# Patient Record
Sex: FEMALE | Race: WHITE | NOT HISPANIC OR LATINO | Employment: OTHER | URBAN - METROPOLITAN AREA
[De-identification: names, ages, dates, MRNs, and addresses within clinical notes are randomized per-mention and may not be internally consistent; named-entity substitution may affect disease eponyms.]

---

## 2024-04-12 ENCOUNTER — TELEPHONE (OUTPATIENT)
Dept: PSYCHIATRY | Facility: CLINIC | Age: 39
End: 2024-04-12

## 2024-04-12 NOTE — TELEPHONE ENCOUNTER
Patient called in looking for med mgmt services. Writer sending a message to Mony to give patient a call.

## 2024-04-12 NOTE — TELEPHONE ENCOUNTER
"Behavioral Health Outpatient Intake Questions    Referred By   : SELF    Please advise interviewee that they need to answer all questions truthfully to allow for best care, and any misrepresentations of information may affect their ability to be seen at this clinic   => Was this discussed? Yes     If Minor Child (under age 18)    Who is/are the legal guardian(s) of the child?     Is there a custody agreement? No     If \"YES\"- Custody orders must be obtained prior to scheduling the first appointment  In addition, Consent to Treatment must be signed by all legal guardians prior to scheduling the first appointment    If \"NO\"- Consent to Treatment must be signed by all legal guardians prior to scheduling the first appointment    Behavioral Health Outpatient Intake History -     Presenting Problem (in patient's own words): Med mgt services.  Moved here in January and needs a new provider    Are there any communication barriers for this patient?     No                                               If yes, please describe barriers:   If there is a unique situation, please refer to Jian Novak/Elvira Christine for final determination.    Are you taking any psychiatric medications? Yes     If \"YES\" -What are they Ziprasidone    If \"YES\" -Who prescribes? Dr. Clemente Ennis    Has the Patient previously received outpatient Talk Therapy or Medication Management from [facility]  No        If \"YES\"- When, Where and with Whom?         If \"NO\" -Has Patient received these services elsewhere?       If \"YES\" -When, Where, and with Whom?    Has the Patient abused alcohol or other substances in the last 6 months ? No       If \"YES\" -What substance, How much, How often?     If illegal substance: Refer to [facility] (for ADELIA) or [facility].   If Alcohol in excess of 10 drinks per week:  Refer to [facility] (for ADELIA) or [facility]    Legal History-     Is this treatment court ordered? No   If \"yes \"send to :  Talk Therapy " ": Send to Jian Christine for final determination   Med Management: Send to Dr Carrion for final determination     Has the Patient been convicted of a felony?  No   If \"Yes\" send to -When, What?  Talk Therapy : Send to Jian Christine for final determination   Med Management: Send to Dr Carrion for final determination     ACCEPTED as a patient Yes  If \"Yes\" Appointment Date: 4/24/24    Referred Elsewhere? No  If “Yes” - (Where? Ex: [facility], [facility], [facility], [facility], etc.)     Name of Insurance Co: [place] [employer]/ [facility]   Insurance ID# [id number]  Insurance Phone #  If ins is primary or secondary?  If patient is a minor, parents information such as Name, D.O.B of guarantor.  "

## 2024-04-22 NOTE — BH TREATMENT PLAN
TREATMENT PLAN (Medication Management Only)        [facility] - [facility]    Name and Date of Birth:  Oksana Yuan 38 y.o. 1985  Date of Treatment Plan: April 24, 2024  Diagnosis/Diagnoses:    1. MARIBEL (generalized anxiety disorder)    2. Major depressive disorder, recurrent, severe with psychotic features (HCC)    3. Paranoid schizophrenia (HCC)      Strengths/Personal Resources for Self-Care: supportive friends, taking medications as prescribed.  Area/Areas of need (in own words): anxiety, depression, sleep problems  1. Long Term Goal: improve mood.  Target Date:6 months - 10/24/2024  Person/Persons responsible for completion of goal: Oksana  2.  Short Term Objective (s) - How will we reach this goal?:   A. Provider new recommended medication/dosage changes and/or continue medication(s): continue current medications as prescribed.  B. Attend medication management appointments regularly.  C. N/A.  Target Date:6 months - 10/24/2024  Person/Persons Responsible for Completion of Goal: Oksana  Progress Towards Goals: continuing treatment  Treatment Modality: medication management every 1 months  Review due 180 days from date of this plan: 6 months - 10/24/2024  Expected length of service: ongoing treatment  My Physician/PA/NP and I have developed this plan together and I agree to work on the goals and objectives. I understand the treatment goals that were developed for my treatment.

## 2024-04-22 NOTE — BH CRISIS PLAN
Client Name: Oksana Yuan       Client YOB: 1985    MikeJed Safety Plan      Creation Date: 4/24/24 Update Date: 4/24/24   Created By: Yani Plaza PA-C Last Updated By: Yani Plaza PA-C      Step 1: Warning Signs:   Warning Signs   Stop taking care of myself   Destructive to environment and self   Increased self harming   Substance use increased            Step 2: Internal Coping Strategies:   Internal Coping Strategies   Go for a walk and get fresh air   Play guitar or listen to music   Watch tv or a show   Writing or drawing            Step 3: People and social settings that provide distraction:   Name Contact Information   Roommate (Pascual) number in cell phone   Roommate (Clarita) number in cell phone   Roommate (Adri) number in cell phone            Step 4: People whom I can ask for help during a crisis:      Name Contact Information    Roommate (Pascual) number in cell phone    Roommate (Clarita) number in cell phone    Roommate (Adri) number in cell phone      Step 5: Professionals or agencies I can contact during a crisis:      Clinican/Agency Name Phone Emergency Contact    Yani Plaza PA-C [phone]       Local Emergency Department Emergency Department Phone Emergency Department Address    911          Crisis Phone Numbers:   Suicide Prevention Lifeline: Call or Text  597 Crisis Text Line: Text HOME to 855-609   Please note: Some [facility] do not have a separate number for Child/Adolescent specific crisis. If your county is not listed under Child/Adolescent, please call the adult number for your county      Adult Crisis Numbers: Child/Adolescent Crisis Numbers   [facility]: [phone] [facility]: [phone]   [facility]: [phone] [facility]: [phone]   [facility]: [phone] [place]: [phone]   [facility]: [phone] [facility]: [phone]   [facility]: [phone]    [facility]: [phone]   [facility]: [phone]   [facility]: [phone] (daytime) [phone] (after hours, weekends, holidays)      Step 6: Making the environment safer (plan for lethal means safety):   Patient did not identify any lethal methods: Yes     Optional: What is most important to me and worth living for?   The people that are in my life      [place] Safety Plan. Kailyn Mckeon and Curry Adkins. Used with permission of the authors.
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
## 2024-04-24 ENCOUNTER — OFFICE VISIT (OUTPATIENT)
Dept: PSYCHIATRY | Facility: CLINIC | Age: 39
End: 2024-04-24
Payer: COMMERCIAL

## 2024-04-24 DIAGNOSIS — F20.0 PARANOID SCHIZOPHRENIA (HCC): ICD-10-CM

## 2024-04-24 DIAGNOSIS — F41.1 GAD (GENERALIZED ANXIETY DISORDER): Primary | ICD-10-CM

## 2024-04-24 DIAGNOSIS — F33.3 MAJOR DEPRESSIVE DISORDER, RECURRENT, SEVERE WITH PSYCHOTIC FEATURES (HCC): ICD-10-CM

## 2024-04-24 DIAGNOSIS — G47.00 INSOMNIA, UNSPECIFIED TYPE: ICD-10-CM

## 2024-04-24 PROBLEM — R10.13 CHRONIC EPIGASTRIC PAIN: Status: ACTIVE | Noted: 2017-04-17

## 2024-04-24 PROBLEM — Z91.51 H/O: SUICIDE ATTEMPT: Status: ACTIVE | Noted: 2017-02-08

## 2024-04-24 PROBLEM — F32.A DEPRESSION: Status: ACTIVE | Noted: 2017-02-08

## 2024-04-24 PROBLEM — F64.9 GENDER DYSPHORIA: Status: ACTIVE | Noted: 2017-04-17

## 2024-04-24 PROBLEM — E34.9 ENDOCRINOPATHY: Status: ACTIVE | Noted: 2017-04-17

## 2024-04-24 PROBLEM — L60.0 INGROWN NAIL: Status: ACTIVE | Noted: 2021-08-30

## 2024-04-24 PROBLEM — F64.0 TRANSSEXUALISM: Status: ACTIVE | Noted: 2017-02-08

## 2024-04-24 PROBLEM — G89.29 CHRONIC EPIGASTRIC PAIN: Status: ACTIVE | Noted: 2017-04-17

## 2024-04-24 PROCEDURE — 90792 PSYCH DIAG EVAL W/MED SRVCS: CPT

## 2024-04-24 RX ORDER — ZIPRASIDONE HYDROCHLORIDE 20 MG/1
CAPSULE ORAL
COMMUNITY
Start: 2024-04-04 | End: 2024-04-24 | Stop reason: SDUPTHER

## 2024-04-24 RX ORDER — ZIPRASIDONE HYDROCHLORIDE 20 MG/1
20 CAPSULE ORAL
Qty: 30 CAPSULE | Refills: 2 | Status: SHIPPED | OUTPATIENT
Start: 2024-04-24 | End: 2024-05-08

## 2024-04-24 RX ORDER — ZIPRASIDONE HYDROCHLORIDE 40 MG/1
CAPSULE ORAL
COMMUNITY
Start: 2024-04-04 | End: 2024-04-24 | Stop reason: SDUPTHER

## 2024-04-24 RX ORDER — CLONAZEPAM 0.5 MG/1
0.5 TABLET ORAL 2 TIMES DAILY
Qty: 30 TABLET | Refills: 0 | Status: SHIPPED | OUTPATIENT
Start: 2024-04-24 | End: 2024-05-08

## 2024-04-24 RX ORDER — DULOXETIN HYDROCHLORIDE 20 MG/1
20 CAPSULE, DELAYED RELEASE ORAL DAILY
COMMUNITY
Start: 2024-03-25 | End: 2024-04-24 | Stop reason: SDUPTHER

## 2024-04-24 RX ORDER — ZOLPIDEM TARTRATE 5 MG/1
5 TABLET ORAL
Qty: 30 TABLET | Refills: 0 | Status: SHIPPED | OUTPATIENT
Start: 2024-04-24

## 2024-04-24 RX ORDER — ZIPRASIDONE HYDROCHLORIDE 40 MG/1
40 CAPSULE ORAL EVERY MORNING
Qty: 30 CAPSULE | Refills: 2 | Status: SHIPPED | OUTPATIENT
Start: 2024-04-24 | End: 2024-05-08

## 2024-04-24 RX ORDER — DULOXETIN HYDROCHLORIDE 20 MG/1
20 CAPSULE, DELAYED RELEASE ORAL DAILY
Qty: 30 CAPSULE | Refills: 2 | Status: SHIPPED | OUTPATIENT
Start: 2024-04-24
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
## 2024-05-08 ENCOUNTER — TELEMEDICINE (OUTPATIENT)
Dept: PSYCHIATRY | Facility: CLINIC | Age: 39
End: 2024-05-08
Payer: COMMERCIAL

## 2024-05-08 DIAGNOSIS — F20.0 PARANOID SCHIZOPHRENIA (HCC): Primary | ICD-10-CM

## 2024-05-08 DIAGNOSIS — F20.0 PARANOID SCHIZOPHRENIA (HCC): ICD-10-CM

## 2024-05-08 DIAGNOSIS — F41.1 GAD (GENERALIZED ANXIETY DISORDER): ICD-10-CM

## 2024-05-08 DIAGNOSIS — F33.3 MAJOR DEPRESSIVE DISORDER, RECURRENT, SEVERE WITH PSYCHOTIC FEATURES (HCC): ICD-10-CM

## 2024-05-08 DIAGNOSIS — F64.9 GENDER DYSPHORIA: ICD-10-CM

## 2024-05-08 PROCEDURE — 99214 OFFICE O/P EST MOD 30 MIN: CPT

## 2024-05-08 RX ORDER — LURASIDONE HYDROCHLORIDE 40 MG/1
40 TABLET, FILM COATED ORAL
Qty: 30 TABLET | Refills: 2 | Status: SHIPPED | OUTPATIENT
Start: 2024-05-08 | End: 2024-05-09

## 2024-05-08 RX ORDER — CLONAZEPAM 1 MG/1
1 TABLET ORAL 2 TIMES DAILY
Qty: 60 TABLET | Refills: 0 | Status: SHIPPED | OUTPATIENT
Start: 2024-05-08

## 2024-05-09 RX ORDER — LURASIDONE HYDROCHLORIDE 40 MG/1
40 TABLET, FILM COATED ORAL
Qty: 30 TABLET | Refills: 2 | Status: SHIPPED | OUTPATIENT
Start: 2024-05-09

## 2024-05-09 NOTE — TELEPHONE ENCOUNTER
Rec'd approval from insurance   Message left for pharmacy   Approval scanned     Can you please send this back as it's been approved

## 2024-05-10 ENCOUNTER — DOCUMENTATION (OUTPATIENT)
Dept: PSYCHIATRY | Facility: CLINIC | Age: 39
End: 2024-05-10

## 2024-05-21 DIAGNOSIS — G47.00 INSOMNIA, UNSPECIFIED TYPE: ICD-10-CM

## 2024-05-21 PROCEDURE — NC001 PR NO CHARGE

## 2024-05-21 RX ORDER — ZOLPIDEM TARTRATE 5 MG/1
5 TABLET ORAL
Qty: 30 TABLET | Refills: 0 | Status: SHIPPED | OUTPATIENT
Start: 2024-05-21
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
## 2024-06-05 ENCOUNTER — TELEMEDICINE (OUTPATIENT)
Dept: PSYCHIATRY | Facility: CLINIC | Age: 39
End: 2024-06-05
Payer: COMMERCIAL

## 2024-06-05 DIAGNOSIS — F41.1 GAD (GENERALIZED ANXIETY DISORDER): ICD-10-CM

## 2024-06-05 DIAGNOSIS — F33.3 MAJOR DEPRESSIVE DISORDER, RECURRENT, SEVERE WITH PSYCHOTIC FEATURES (HCC): ICD-10-CM

## 2024-06-05 DIAGNOSIS — F20.0 PARANOID SCHIZOPHRENIA (HCC): Primary | ICD-10-CM

## 2024-06-05 DIAGNOSIS — F33.1 MODERATE EPISODE OF RECURRENT MAJOR DEPRESSIVE DISORDER (HCC): ICD-10-CM

## 2024-06-05 DIAGNOSIS — G47.00 INSOMNIA, UNSPECIFIED TYPE: ICD-10-CM

## 2024-06-05 DIAGNOSIS — F64.9 GENDER DYSPHORIA: ICD-10-CM

## 2024-06-05 PROCEDURE — 99213 OFFICE O/P EST LOW 20 MIN: CPT

## 2024-06-05 RX ORDER — ESTRADIOL VALERATE 40 MG/ML
INJECTION INTRAMUSCULAR
COMMUNITY
Start: 2024-06-04

## 2024-06-05 RX ORDER — LURASIDONE HYDROCHLORIDE 80 MG/1
80 TABLET, FILM COATED ORAL
Qty: 30 TABLET | Refills: 2 | Status: SHIPPED | OUTPATIENT
Start: 2024-06-05

## 2024-06-05 RX ORDER — CLONAZEPAM 1 MG/1
1 TABLET ORAL 2 TIMES DAILY
Qty: 60 TABLET | Refills: 0 | Status: SHIPPED | OUTPATIENT
Start: 2024-06-05

## 2024-06-05 RX ORDER — ZOLPIDEM TARTRATE 5 MG/1
5 TABLET ORAL
Qty: 30 TABLET | Refills: 0 | Status: SHIPPED | OUTPATIENT
Start: 2024-06-19

## 2024-06-05 RX ORDER — PROGESTERONE 200 MG/1
CAPSULE ORAL
COMMUNITY
Start: 2024-06-03

## 2024-06-10 ENCOUNTER — TELEPHONE (OUTPATIENT)
Dept: PSYCHIATRY | Facility: CLINIC | Age: 39
End: 2024-06-10

## 2024-06-10 NOTE — TELEPHONE ENCOUNTER
Unable to leave voicemail informing patient of the Psych Encounter form needing to be signed as a requirement from the insurance company for billing purposes. If patients contacts office, please make patient aware that the form can be accessed via [employer] to sign electronically and must be signed for each visit as a requirement to continue future visits with provider.

## 2024-06-18 ENCOUNTER — TELEPHONE (OUTPATIENT)
Dept: PSYCHIATRY | Facility: CLINIC | Age: 39
End: 2024-06-18

## 2024-06-18 NOTE — TELEPHONE ENCOUNTER
Unable to leave voicemail informing patient of the Psych Encounter form needing to be signed as a requirement from the insurance company for billing purposes. If patients contacts office, please make patient aware that the form can be accessed via [employer] to sign electronically and must be signed for each visit as a requirement to continue future visits with provider.

## 2024-06-24 ENCOUNTER — TELEPHONE (OUTPATIENT)
Dept: PSYCHIATRY | Facility: CLINIC | Age: 39
End: 2024-06-24

## 2024-06-24 NOTE — TELEPHONE ENCOUNTER
Unable to leave voicemail informing patient of the Psych Encounter form needing to be signed as a requirement from the insurance company for billing purposes. If patients contacts office, please make patient aware that the form can be accessed via [employer] to sign electronically and must be signed for each visit as a requirement to continue future visits with provider.
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
## 2024-07-03 ENCOUNTER — TELEMEDICINE (OUTPATIENT)
Dept: PSYCHIATRY | Facility: CLINIC | Age: 39
End: 2024-07-03
Payer: COMMERCIAL

## 2024-07-03 DIAGNOSIS — F64.9 GENDER DYSPHORIA: ICD-10-CM

## 2024-07-03 DIAGNOSIS — F20.0 PARANOID SCHIZOPHRENIA (HCC): Primary | ICD-10-CM

## 2024-07-03 DIAGNOSIS — F41.1 GAD (GENERALIZED ANXIETY DISORDER): ICD-10-CM

## 2024-07-03 DIAGNOSIS — G47.00 INSOMNIA, UNSPECIFIED TYPE: ICD-10-CM

## 2024-07-03 DIAGNOSIS — F33.3 MAJOR DEPRESSIVE DISORDER, RECURRENT, SEVERE WITH PSYCHOTIC FEATURES (HCC): ICD-10-CM

## 2024-07-03 PROCEDURE — 99213 OFFICE O/P EST LOW 20 MIN: CPT

## 2024-07-03 RX ORDER — DULOXETIN HYDROCHLORIDE 30 MG/1
30 CAPSULE, DELAYED RELEASE ORAL DAILY
Qty: 30 CAPSULE | Refills: 2 | Status: SHIPPED | OUTPATIENT
Start: 2024-07-03

## 2024-07-03 RX ORDER — LORAZEPAM 1 MG/1
1 TABLET ORAL 2 TIMES DAILY PRN
Qty: 60 TABLET | Refills: 0 | Status: SHIPPED | OUTPATIENT
Start: 2024-07-03

## 2024-07-03 RX ORDER — ZOLPIDEM TARTRATE 5 MG/1
5 TABLET ORAL
Qty: 30 TABLET | Refills: 0 | Status: SHIPPED | OUTPATIENT
Start: 2024-07-03
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
## 2024-07-31 ENCOUNTER — TELEMEDICINE (OUTPATIENT)
Dept: PSYCHIATRY | Facility: CLINIC | Age: 39
End: 2024-07-31
Payer: COMMERCIAL

## 2024-07-31 DIAGNOSIS — F33.3 MAJOR DEPRESSIVE DISORDER, RECURRENT, SEVERE WITH PSYCHOTIC FEATURES (HCC): Primary | ICD-10-CM

## 2024-07-31 DIAGNOSIS — F20.0 PARANOID SCHIZOPHRENIA (HCC): ICD-10-CM

## 2024-07-31 DIAGNOSIS — G47.00 INSOMNIA, UNSPECIFIED TYPE: ICD-10-CM

## 2024-07-31 DIAGNOSIS — F64.9 GENDER DYSPHORIA: ICD-10-CM

## 2024-07-31 DIAGNOSIS — F41.1 GAD (GENERALIZED ANXIETY DISORDER): ICD-10-CM

## 2024-07-31 PROCEDURE — 99212 OFFICE O/P EST SF 10 MIN: CPT

## 2024-07-31 RX ORDER — ZOLPIDEM TARTRATE 5 MG/1
5 TABLET ORAL
Qty: 30 TABLET | Refills: 0 | Status: SHIPPED | OUTPATIENT
Start: 2024-07-31

## 2024-07-31 RX ORDER — LORAZEPAM 2 MG/1
2 TABLET ORAL 2 TIMES DAILY PRN
Qty: 28 TABLET | Refills: 0 | Status: SHIPPED | OUTPATIENT
Start: 2024-07-31

## 2024-07-31 RX ORDER — DULOXETIN HYDROCHLORIDE 60 MG/1
60 CAPSULE, DELAYED RELEASE ORAL DAILY
Qty: 30 CAPSULE | Refills: 1 | Status: SHIPPED | OUTPATIENT
Start: 2024-07-31

## 2024-08-12 ENCOUNTER — TELEPHONE (OUTPATIENT)
Dept: PSYCHIATRY | Facility: CLINIC | Age: 39
End: 2024-08-12

## 2024-08-12 NOTE — TELEPHONE ENCOUNTER
Patient called and requested a callback from provider. She states she would like to talk about her current medication.  Please reach out when able.    Thank you.

## 2024-08-13 ENCOUNTER — TELEPHONE (OUTPATIENT)
Dept: PSYCHIATRY | Facility: CLINIC | Age: 39
End: 2024-08-13

## 2024-08-13 DIAGNOSIS — F41.1 GAD (GENERALIZED ANXIETY DISORDER): ICD-10-CM

## 2024-08-13 DIAGNOSIS — F20.0 PARANOID SCHIZOPHRENIA (HCC): Primary | ICD-10-CM

## 2024-08-13 PROCEDURE — NC001 PR NO CHARGE

## 2024-08-13 RX ORDER — LURASIDONE HYDROCHLORIDE 120 MG/1
120 TABLET, FILM COATED ORAL
Qty: 30 TABLET | Refills: 1 | Status: SHIPPED | OUTPATIENT
Start: 2024-08-13

## 2024-08-13 RX ORDER — ALPRAZOLAM 1 MG
1 TABLET ORAL 2 TIMES DAILY PRN
Qty: 28 TABLET | Refills: 0 | Status: SHIPPED | OUTPATIENT
Start: 2024-08-13

## 2024-08-13 NOTE — TELEPHONE ENCOUNTER
Returned patient's phone call from yesterday. Patient reports that the Ativan 2 mg twice daily has not been helping for anxiety concerns. Oksana states that Xanax has worked in the past and she would be willing to try this medication at this time. Patient also states that she has been experiencing hallucinations and voices all day long and she feels like the lurasidone is not working anymore. Patient believes it would be time for an increase in dose. Instructed patient to start taking Latuda 120 mg daily with dinner and this writer will send in Xanax 1 mg twice daily as needed for anxiety. Patient is understanding of this plan and is appreciative of today's phone call. Encouraged her to call the office with any other questions or concerns. Patient denies active SI at this time and denies plan or intent. Patient is scheduled to follow-up on 8/28.

## 2024-08-19 ENCOUNTER — TELEPHONE (OUTPATIENT)
Age: 39
End: 2024-08-19
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
## 2024-08-28 ENCOUNTER — TELEMEDICINE (OUTPATIENT)
Dept: PSYCHIATRY | Facility: CLINIC | Age: 39
End: 2024-08-28
Payer: COMMERCIAL

## 2024-08-28 DIAGNOSIS — F20.0 PARANOID SCHIZOPHRENIA (HCC): Primary | ICD-10-CM

## 2024-08-28 DIAGNOSIS — F41.1 GAD (GENERALIZED ANXIETY DISORDER): ICD-10-CM

## 2024-08-28 DIAGNOSIS — F33.3 MAJOR DEPRESSIVE DISORDER, RECURRENT, SEVERE WITH PSYCHOTIC FEATURES (HCC): ICD-10-CM

## 2024-08-28 DIAGNOSIS — G47.00 INSOMNIA, UNSPECIFIED TYPE: ICD-10-CM

## 2024-08-28 PROCEDURE — 99212 OFFICE O/P EST SF 10 MIN: CPT

## 2024-08-28 RX ORDER — CITALOPRAM HYDROBROMIDE 20 MG/1
20 TABLET ORAL DAILY
Qty: 30 TABLET | Refills: 1 | Status: SHIPPED | OUTPATIENT
Start: 2024-08-28

## 2024-08-28 RX ORDER — ALPRAZOLAM 1 MG
1 TABLET ORAL 2 TIMES DAILY PRN
Qty: 60 TABLET | Refills: 0 | Status: SHIPPED | OUTPATIENT
Start: 2024-08-28

## 2024-08-28 RX ORDER — ZOLPIDEM TARTRATE 5 MG/1
5 TABLET ORAL
Qty: 30 TABLET | Refills: 0 | Status: SHIPPED | OUTPATIENT
Start: 2024-08-28

## 2024-09-05 ENCOUNTER — TELEPHONE (OUTPATIENT)
Age: 39
End: 2024-09-05

## 2024-09-05 ENCOUNTER — TELEPHONE (OUTPATIENT)
Dept: PSYCHIATRY | Facility: CLINIC | Age: 39
End: 2024-09-05

## 2024-09-05 DIAGNOSIS — F20.0 PARANOID SCHIZOPHRENIA (HCC): Primary | ICD-10-CM

## 2024-09-05 PROCEDURE — NC001 PR NO CHARGE

## 2024-09-05 RX ORDER — LURASIDONE HYDROCHLORIDE 80 MG/1
80 TABLET, FILM COATED ORAL 2 TIMES DAILY
Qty: 60 TABLET | Refills: 1 | Status: SHIPPED | OUTPATIENT
Start: 2024-09-05

## 2024-09-05 NOTE — TELEPHONE ENCOUNTER
Returned patient's phone call from earlier today regarding her Latuda dosing. Patient states that they are experiencing auditory and visual hallucinations later in the day and they feel that the Latuda is wearing off too soon. Patient has been taking Latuda 120 mg daily but feels that twice a day dosing could be more beneficial. Talked with patient about changing dosing and increasing to 80 mg twice daily. Patient states that this sounds like a good idea and is hoping that this helps with the auditory hallucinations that happened around 6 PM and in the morning before taking the medication. Oksana is aware that the max dose of Latuda is 160 mg daily and it is generally recommended to be dosed once daily but she would like to trial twice daily dosing. Patient is aware of the side effects. Patient states that the start of Celexa is going well so far but it is too early to see a difference yet. Patient is comfortable following up on 9/26 as scheduled. Encouraged patient to call the office with any questions or concerns before this.

## 2024-09-05 NOTE — TELEPHONE ENCOUNTER
Patient called and would like to speak to provider about adjusting the medication lurasidone, please advise

## 2024-09-05 NOTE — TELEPHONE ENCOUNTER
Returned Oksana's call.  She would like her Latuda increased and is also requesting that it be changed to BID dosing.   Feels that dose wears off too quickly and she is having auditory and visual hallucinations.      Will refer to Yani Plaza for review.
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
## 2024-09-26 ENCOUNTER — TELEMEDICINE (OUTPATIENT)
Dept: PSYCHIATRY | Facility: CLINIC | Age: 39
End: 2024-09-26
Payer: COMMERCIAL

## 2024-09-26 DIAGNOSIS — F33.1 MODERATE EPISODE OF RECURRENT MAJOR DEPRESSIVE DISORDER (HCC): ICD-10-CM

## 2024-09-26 DIAGNOSIS — F64.9 GENDER DYSPHORIA: ICD-10-CM

## 2024-09-26 DIAGNOSIS — F20.0 PARANOID SCHIZOPHRENIA (HCC): Primary | ICD-10-CM

## 2024-09-26 DIAGNOSIS — G47.00 INSOMNIA, UNSPECIFIED TYPE: ICD-10-CM

## 2024-09-26 DIAGNOSIS — F41.1 GAD (GENERALIZED ANXIETY DISORDER): ICD-10-CM

## 2024-09-26 DIAGNOSIS — F33.3 MAJOR DEPRESSIVE DISORDER, RECURRENT, SEVERE WITH PSYCHOTIC FEATURES (HCC): ICD-10-CM

## 2024-09-26 PROCEDURE — 99213 OFFICE O/P EST LOW 20 MIN: CPT

## 2024-09-26 RX ORDER — CITALOPRAM HYDROBROMIDE 20 MG/1
30 TABLET ORAL DAILY
Qty: 45 TABLET | Refills: 1 | Status: SHIPPED | OUTPATIENT
Start: 2024-09-26

## 2024-09-26 RX ORDER — LURASIDONE HYDROCHLORIDE 80 MG/1
160 TABLET, FILM COATED ORAL
Qty: 60 TABLET | Refills: 1 | Status: SHIPPED | OUTPATIENT
Start: 2024-09-26

## 2024-09-26 RX ORDER — ALPRAZOLAM 1 MG
1 TABLET ORAL 2 TIMES DAILY PRN
Qty: 60 TABLET | Refills: 0 | Status: SHIPPED | OUTPATIENT
Start: 2024-09-26

## 2024-09-26 RX ORDER — ZOLPIDEM TARTRATE 5 MG/1
5 TABLET ORAL
Qty: 30 TABLET | Refills: 0 | Status: SHIPPED | OUTPATIENT
Start: 2024-09-26

## 2024-09-26 NOTE — ASSESSMENT & PLAN NOTE
Generally stable with medication in place  Continue Ambien 5 mg at bedtime as needed  Orders:    zolpidem (AMBIEN) 5 mg tablet; Take 1 tablet (5 mg total) by mouth daily at bedtime as needed for sleep

## 2024-09-26 NOTE — ASSESSMENT & PLAN NOTE
Variable  Continue Latuda 160 mg daily  Recommended outpatient therapy  Orders:    lurasidone (LATUDA) 80 mg tablet; Take 2 tablets (160 mg total) by mouth daily with breakfast

## 2024-09-26 NOTE — ASSESSMENT & PLAN NOTE
Variable  Increase Celexa to 30 mg daily  Recommended outpatient therapy  Orders:    citalopram (CeleXA) 20 mg tablet; Take 1.5 tablets (30 mg total) by mouth daily

## 2024-09-26 NOTE — ASSESSMENT & PLAN NOTE
Variable  Increase Celexa to 30 mg daily  Continue Xanax 1 mg twice daily as needed for times of anxiety  Recommended outpatient therapy  Orders:    ALPRAZolam (XANAX) 1 mg tablet; Take 1 tablet (1 mg total) by mouth 2 (two) times a day as needed for anxiety    citalopram (CeleXA) 20 mg tablet; Take 1.5 tablets (30 mg total) by mouth daily

## 2024-10-23 NOTE — PSYCH
Patient was unable to log onto the virtual visit during her session on 10/24/2025. Patient called to cancel the appointment and was rescheduled for the following day, 10/25/2024.    Yani Plaza PA-C  10/24/24

## 2024-10-24 ENCOUNTER — TELEMEDICINE (OUTPATIENT)
Dept: PSYCHIATRY | Facility: CLINIC | Age: 39
End: 2024-10-24

## 2024-10-24 DIAGNOSIS — F41.1 GAD (GENERALIZED ANXIETY DISORDER): Primary | ICD-10-CM

## 2024-10-25 ENCOUNTER — TELEMEDICINE (OUTPATIENT)
Dept: PSYCHIATRY | Facility: CLINIC | Age: 39
End: 2024-10-25
Payer: COMMERCIAL

## 2024-10-25 DIAGNOSIS — F20.0 PARANOID SCHIZOPHRENIA (HCC): Primary | ICD-10-CM

## 2024-10-25 DIAGNOSIS — F33.3 MAJOR DEPRESSIVE DISORDER, RECURRENT, SEVERE WITH PSYCHOTIC FEATURES (HCC): ICD-10-CM

## 2024-10-25 DIAGNOSIS — F41.1 GAD (GENERALIZED ANXIETY DISORDER): ICD-10-CM

## 2024-10-25 DIAGNOSIS — G47.00 INSOMNIA, UNSPECIFIED TYPE: ICD-10-CM

## 2024-10-25 DIAGNOSIS — F64.9 GENDER DYSPHORIA: ICD-10-CM

## 2024-10-25 PROCEDURE — 99214 OFFICE O/P EST MOD 30 MIN: CPT

## 2024-10-25 RX ORDER — CITALOPRAM HYDROBROMIDE 40 MG/1
40 TABLET ORAL DAILY
Qty: 30 TABLET | Refills: 1 | Status: SHIPPED | OUTPATIENT
Start: 2024-10-25

## 2024-10-25 RX ORDER — ALPRAZOLAM 1 MG/1
1 TABLET ORAL 3 TIMES DAILY PRN
Qty: 90 TABLET | Refills: 0 | Status: SHIPPED | OUTPATIENT
Start: 2024-10-25

## 2024-10-25 RX ORDER — HALOPERIDOL 1 MG/1
1 TABLET ORAL 2 TIMES DAILY
Qty: 60 TABLET | Refills: 1 | Status: SHIPPED | OUTPATIENT
Start: 2024-10-25

## 2024-10-25 RX ORDER — ZOLPIDEM TARTRATE 5 MG/1
5 TABLET ORAL
Qty: 30 TABLET | Refills: 0 | Status: SHIPPED | OUTPATIENT
Start: 2024-10-25

## 2024-10-25 NOTE — ASSESSMENT & PLAN NOTE
Generally stable   Continue Ambien 5 mg at bedtime as needed for insomnia  Orders:    zolpidem (AMBIEN) 5 mg tablet; Take 1 tablet (5 mg total) by mouth daily at bedtime as needed for sleep

## 2024-10-25 NOTE — ASSESSMENT & PLAN NOTE
Variable  Increase to Celexa 40 mg daily to help with depressive and anxiety. Option to try Trintellix in the future if needed.  Increase to Xanax 1 mg BID as needed for times of anxiety. Will monitor for further titration.   Recommended outpatient therapy  Orders:    ALPRAZolam (XANAX) 1 mg tablet; Take 1 tablet (1 mg total) by mouth 3 (three) times a day as needed for anxiety    citalopram (CeleXA) 40 mg tablet; Take 1 tablet (40 mg total) by mouth daily

## 2024-10-25 NOTE — BH TREATMENT PLAN
TREATMENT PLAN (Medication Management Only)        [facility]    Name and Date of Birth:  Oksana Yuan 39 y.o. 1985  Date of Treatment Plan: October 25, 2024  Diagnosis/Diagnoses:    1. Paranoid schizophrenia (HCC)    2. MARIBEL (generalized anxiety disorder)    3. Gender dysphoria    4. Insomnia, unspecified type    5. Major depressive disorder, recurrent, severe with psychotic features (HCC)      Strengths/Personal Resources for Self-Care: supportive friends, taking medications as prescribed.  Area/Areas of need (in own words): anxiety, depression, hallucinations  1. Long Term Goal: improve mood.  Target Date:6 months - 4/25/2025  Person/Persons responsible for completion of goal: Oksana  2.  Short Term Objective (s) - How will we reach this goal?:   A. Provider new recommended medication/dosage changes and/or continue medication(s): continue current medications as prescribed.  B. Attend medication management appointments regularly.  C. N/A.  Target Date:6 months - 4/25/2025  Person/Persons Responsible for Completion of Goal: Oksana  Progress Towards Goals: continuing treatment  Treatment Modality: medication management every 1 months  Review due 180 days from date of this plan: 6 months - 4/25/2025  Expected length of service: ongoing treatment  My Physician/PA/NP and I have developed this plan together and I agree to work on the goals and objectives. I understand the treatment goals that were developed for my treatment.
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
## 2024-10-25 NOTE — ASSESSMENT & PLAN NOTE
Variable  Start Haldol 1 mg BID to help with hallucinations. Patient aware that adding on this medication could interact with Latuda and they were informed of possible side effects. Patient will call the office with any concerns.   Continue Latuda 160 mg daily with a meal to help with hallucinations and mood, patient aware to take this with meal  Recommended outpatient therapy  Orders:    haloperidol (HALDOL) 1 mg tablet; Take 1 tablet (1 mg total) by mouth 2 (two) times a day

## 2024-11-06 ENCOUNTER — TELEMEDICINE (OUTPATIENT)
Dept: BEHAVIORAL/MENTAL HEALTH CLINIC | Facility: CLINIC | Age: 39
End: 2024-11-06
Payer: COMMERCIAL

## 2024-11-06 DIAGNOSIS — F41.1 GAD (GENERALIZED ANXIETY DISORDER): Primary | ICD-10-CM

## 2024-11-06 DIAGNOSIS — F64.9 GENDER DYSPHORIA: ICD-10-CM

## 2024-11-06 DIAGNOSIS — F33.1 MODERATE EPISODE OF RECURRENT MAJOR DEPRESSIVE DISORDER (HCC): ICD-10-CM

## 2024-11-06 PROCEDURE — 90791 PSYCH DIAGNOSTIC EVALUATION: CPT | Performed by: COUNSELOR

## 2024-11-06 NOTE — BH TREATMENT PLAN
Outpatient Behavioral Health Psychotherapy Treatment Plan    Oksana Yuan  1985     Date of Initial Psychotherapy Assessment: 11/06/2024   Date of Current Treatment Plan: 11/06/24  Treatment Plan Target Date: tbd  Treatment Plan Expiration Date: 5/06/2024    Diagnosis:   1. Gender dysphoria        2. MARIBEL (generalized anxiety disorder)            Area(s) of Need: Try to keep my emotions in control.    Long Term Goal 1 (in the client's own words): I want to be able to leave the house.    Stage of Change: Contemplation    Target Date for completion: tbd     Anticipated therapeutic modalities: cbt     People identified to complete this goal: Ct and therapist      Objective 1: (identify the means of measuring success in meeting the objective): I would like to be able to leave the house 3 times a week.           I am currently under the care of a [facility] psychiatric provider: yes    My [facility] psychiatric provider is: Dr. MICHAEL Plaza    I am currently taking psychiatric medications: Yes, as prescribed    I feel that I will be ready for discharge from mental health care when I reach the following (measurable goal/objective): Disharge may be difficult but client is willing to work towards spreading session out to hopefully monthly.    For children and adults who have a legal guardian:   Has there been any change to custody orders and/or guardianship status? NA. If yes, attach updated documentation.    I have created my Crisis Plan and have been offered a copy of this plan    Behavioral Health Treatment Plan [facility]: Diagnosis and Treatment Plan explained to Oksana Yuan acknowledges an understanding of their diagnosis. Oksana Yuan agrees to this treatment plan.    I have been offered a copy of this Treatment Plan. yes

## 2024-11-06 NOTE — PSYCH
"Behavioral Health Psychotherapy Progress Note    Psychotherapy Provided: {PSYCHOTHERAPY:53945}    1. Gender dysphoria        2. MARIBEL (generalized anxiety disorder)            Goals addressed in session: {GOALS:32539}     DATA: ***  During this session, this clinician used the following therapeutic modalities: {Therapeutic Modalities:45910}    Substance Abuse {Was/was not:07470} addressed during this session. If the client is diagnosed with a co-occurring substance use disorder, please indicate any changes in the frequency or amount of use: ***. Stage of change for addressing substance use diagnoses: {Psych Substance Abuse Stage of Change:31653}    ASSESSMENT:  Oksana Yuan presents with a {Psych/BH Mood:24903::\"Euthymic/ normal\"} mood.     her affect is {Psych/BH Affect:13421::\"Normal range and intensity\"}, which is {Congruent/Non Congruent:73873}, with her mood and the content of the session. The client {HAS/HAS NOT:20194} made progress on their goals.    *** Oksana Yuan presents with a {PSYCH Suicide/Homicide Risk:20919} risk of suicide, {PSYCH Suicide/Homicide Risk:81580} risk of self-harm, and {PSYCH Suicide/Homicide Risk:03256} risk of harm to others.    For any risk assessment that surpasses a \"low\" rating, a safety plan must be developed.    A safety plan was indicated: {YES/NO:20200}  If yes, describe in detail ***    PLAN: Between sessions, Oksana Yuan will ***. At the next session, the therapist will use {Therapeutic Modalities:10737} to address ***.    Behavioral Health Treatment Plan and Discharge Planning: Oksana Yuan is aware of and agrees to continue to work on their treatment plan. They have identified and are working toward their discharge goals. {YES/NO:20200}    Visit start and stop times:    11/06/24  Start Time: 1200  "
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
## 2024-11-11 ENCOUNTER — TELEPHONE (OUTPATIENT)
Dept: PSYCHIATRY | Facility: CLINIC | Age: 39
End: 2024-11-11

## 2024-11-11 NOTE — TELEPHONE ENCOUNTER
Left voicemail informing patient and/or parent/guardian of the Psych Encounter form needing to be signed as a requirement from the insurance company for billing purposes. Patient can access form via [employer] and sign electronically.     Please make patient aware this form must be signed for each visit as a requirement to continue future visits with provider.    Virtual Encounter form 11/6/24

## 2024-11-13 ENCOUNTER — TELEMEDICINE (OUTPATIENT)
Dept: BEHAVIORAL/MENTAL HEALTH CLINIC | Facility: CLINIC | Age: 39
End: 2024-11-13
Payer: COMMERCIAL

## 2024-11-13 DIAGNOSIS — F41.1 GAD (GENERALIZED ANXIETY DISORDER): ICD-10-CM

## 2024-11-13 DIAGNOSIS — F33.1 MODERATE EPISODE OF RECURRENT MAJOR DEPRESSIVE DISORDER (HCC): Primary | ICD-10-CM

## 2024-11-13 PROCEDURE — 90834 PSYTX W PT 45 MINUTES: CPT | Performed by: COUNSELOR

## 2024-11-13 NOTE — PSYCH
"Behavioral Health Psychotherapy Progress Note    Psychotherapy Provided: Individual Psychotherapy     1. Moderate episode of recurrent major depressive disorder (HCC)        2. MARIBEL (generalized anxiety disorder)            Goals addressed in session: Goal 1     DATA: We talked about his ex who moved out in June.  She moved back to [place] where they had lived before in a camper.  Ct does admit that they argued a lot because they were \"very different.\" Discussed that in times of desperation you can start to accept things that are not as good as you were happy and fulfilled.  Ct may need to find new socialization. (Video through Epic did not work for my client today so we talked on the phone through Teams.    During this session, this clinician used the following therapeutic modalities: Cognitive Behavioral Therapy and solution focused therapy.    Substance Abuse was not addressed during this session. If the client is diagnosed with a co-occurring substance use disorder, please indicate any changes in the frequency or amount of use: n/a. Stage of change for addressing substance use diagnoses: No substance use/Not applicable    ASSESSMENT:  Oksana Yuan presents with a Anxious mood.     her affect is Normal range and intensity, which is congruent, with her mood and the content of the session. The client has not made progress on their goals.     Oksana Yuan presents with a none risk of suicide, none risk of self-harm, and none risk of harm to others.    For any risk assessment that surpasses a \"low\" rating, a safety plan must be developed.    A safety plan was indicated: no  If yes, describe in detail n/a    PLAN: Between sessions, Oksana Yuan will look at alternate dating sites to find some new friends and partners. Ct will also try some yoga to help decrease anxiety. At the next session, the therapist will use Cognitive Behavioral Therapy to address Ct's ability to push through general " anxiety.    Behavioral Health Treatment Plan and Discharge Planning: Oksanamaite Yuan is aware of and agrees to continue to work on their treatment plan. They have identified and are working toward their discharge goals. yes    Visit start and stop times:    11/13/24  Start Time: 1400

## 2024-11-25 ENCOUNTER — TELEMEDICINE (OUTPATIENT)
Dept: PSYCHIATRY | Facility: CLINIC | Age: 39
End: 2024-11-25
Payer: COMMERCIAL

## 2024-11-25 DIAGNOSIS — F20.0 PARANOID SCHIZOPHRENIA (HCC): Primary | ICD-10-CM

## 2024-11-25 DIAGNOSIS — F41.1 GAD (GENERALIZED ANXIETY DISORDER): ICD-10-CM

## 2024-11-25 DIAGNOSIS — G47.00 INSOMNIA, UNSPECIFIED TYPE: ICD-10-CM

## 2024-11-25 DIAGNOSIS — F33.3 MAJOR DEPRESSIVE DISORDER, RECURRENT, SEVERE WITH PSYCHOTIC FEATURES (HCC): ICD-10-CM

## 2024-11-25 PROCEDURE — 99214 OFFICE O/P EST MOD 30 MIN: CPT

## 2024-11-25 RX ORDER — LURASIDONE HYDROCHLORIDE 120 MG/1
120 TABLET, FILM COATED ORAL
Qty: 30 TABLET | Refills: 0 | Status: SHIPPED | OUTPATIENT
Start: 2024-11-25

## 2024-11-25 RX ORDER — CITALOPRAM HYDROBROMIDE 40 MG/1
40 TABLET ORAL DAILY
Qty: 30 TABLET | Refills: 1 | Status: SHIPPED | OUTPATIENT
Start: 2024-11-25

## 2024-11-25 RX ORDER — ZOLPIDEM TARTRATE 5 MG/1
5 TABLET ORAL
Qty: 30 TABLET | Refills: 0 | Status: SHIPPED | OUTPATIENT
Start: 2024-12-23

## 2024-11-25 RX ORDER — ALPRAZOLAM 1 MG/1
1 TABLET ORAL 3 TIMES DAILY PRN
Qty: 90 TABLET | Refills: 0 | Status: SHIPPED | OUTPATIENT
Start: 2024-11-25

## 2024-11-25 RX ORDER — HALOPERIDOL 2 MG/1
2 TABLET ORAL 2 TIMES DAILY
Qty: 60 TABLET | Refills: 1 | Status: SHIPPED | OUTPATIENT
Start: 2024-11-25

## 2024-11-25 RX ORDER — ZOLPIDEM TARTRATE 5 MG/1
5 TABLET ORAL
Qty: 30 TABLET | Refills: 0 | Status: SHIPPED | OUTPATIENT
Start: 2024-11-25

## 2024-11-25 RX ORDER — ALPRAZOLAM 1 MG/1
1 TABLET ORAL 3 TIMES DAILY PRN
Qty: 90 TABLET | Refills: 0 | Status: SHIPPED | OUTPATIENT
Start: 2024-12-23

## 2024-11-25 NOTE — ASSESSMENT & PLAN NOTE
Variable   Increase to Haldol 2 mg BID to help with hallucinations. Patient aware that adding on this medication could interact with Latuda and they were informed of possible side effects. Patient will call the office with any concerns.   Decrease to Latuda 120 mg daily with a meal to help with hallucinations and mood, patient aware to take this with meal  Continue Celexa 40 mg daily to help with depressive and anxiety. Option to try Trintellix in the future if needed.  Recommended continuation of outpatient therapy at [facility]    Orders:    citalopram (CeleXA) 40 mg tablet; Take 1 tablet (40 mg total) by mouth daily

## 2024-11-25 NOTE — ASSESSMENT & PLAN NOTE
Variable   Continue Celexa 40 mg daily to help with depressive and anxiety. Option to try Trintellix in the future if needed.  Continue Xanax 1 mg TID as needed for times of anxiety. Will monitor for further titration.   Recommended continuation of outpatient therapy at [facility]    Orders:    ALPRAZolam (XANAX) 1 mg tablet; Take 1 tablet (1 mg total) by mouth 3 (three) times a day as needed for anxiety    citalopram (CeleXA) 40 mg tablet; Take 1 tablet (40 mg total) by mouth daily    ALPRAZolam (XANAX) 1 mg tablet; Take 1 tablet (1 mg total) by mouth 3 (three) times a day as needed for anxiety Do not start before December 23, 2024.

## 2024-11-25 NOTE — ASSESSMENT & PLAN NOTE
Variable   Increase to Haldol 2 mg BID to help with hallucinations. Patient aware that adding on this medication could interact with Latuda and they were informed of possible side effects. Patient will call the office with any concerns.   Decrease to Latuda 120 mg daily with a meal to help with hallucinations and mood, patient aware to take this with meal   Recommended continuation of outpatient therapy at [facility]    Orders:    lurasidone (Latuda) 120 mg tablet; Take 1 tablet (120 mg total) by mouth daily with breakfast    haloperidol (HALDOL) 2 mg tablet; Take 1 tablet (2 mg total) by mouth 2 (two) times a day

## 2024-11-27 ENCOUNTER — TELEMEDICINE (OUTPATIENT)
Dept: BEHAVIORAL/MENTAL HEALTH CLINIC | Facility: CLINIC | Age: 39
End: 2024-11-27
Payer: COMMERCIAL

## 2024-11-27 DIAGNOSIS — F41.1 GAD (GENERALIZED ANXIETY DISORDER): Primary | ICD-10-CM

## 2024-11-27 PROCEDURE — 90832 PSYTX W PT 30 MINUTES: CPT | Performed by: COUNSELOR

## 2024-11-27 NOTE — PSYCH
"Behavioral Health Psychotherapy Progress Note    Psychotherapy Provided: Individual Psychotherapy     1. MARIBEL (generalized anxiety disorder)            Goals addressed in session: Goal 1     DATA:   Ct had nothing to talk about today. She stated that she was out doing errands which is a good thing considering her anxiety.  Ct has not checked out any online social forums.  We discussed her responsibility to cancel appointments the day before to allow us to schedule people in need.  She agreed to that arrangement.    During this session, this clinician used the following therapeutic modalities: Client-centered Therapy    Substance Abuse was not addressed during this session. If the client is diagnosed with a co-occurring substance use disorder, please indicate any changes in the frequency or amount of use: n/a. Stage of change for addressing substance use diagnoses: No substance use/Not applicable    ASSESSMENT:  Oksana Yuan presents with a Euthymic/ normal and Anxious mood.     her affect is Normal range and intensity, which is congruent, with her mood and the content of the session. The client has made progress on their goals.     Oksana Yuan presents with a none risk of suicide, none risk of self-harm, and none risk of harm to others.    For any risk assessment that surpasses a \"low\" rating, a safety plan must be developed.    A safety plan was indicated: no  If yes, describe in detail n/a    PLAN: Between sessions, Oksana Yuan will look into online social forums to gain some connection. At the next session, the therapist will use Client-centered Therapy to address cts goal setting.    Behavioral Health Treatment Plan and Discharge Planning: Oksana Yuan is aware of and agrees to continue to work on their treatment plan. They have identified and are working toward their discharge goals. yes    Visit start and stop times:    11/27/24  Start Time: 1300  "

## 2024-12-09 DIAGNOSIS — F20.0 PARANOID SCHIZOPHRENIA (HCC): ICD-10-CM

## 2024-12-09 DIAGNOSIS — F33.3 MAJOR DEPRESSIVE DISORDER, RECURRENT, SEVERE WITH PSYCHOTIC FEATURES (HCC): ICD-10-CM

## 2024-12-09 DIAGNOSIS — F41.1 GAD (GENERALIZED ANXIETY DISORDER): ICD-10-CM

## 2024-12-11 ENCOUNTER — TELEMEDICINE (OUTPATIENT)
Dept: BEHAVIORAL/MENTAL HEALTH CLINIC | Facility: CLINIC | Age: 39
End: 2024-12-11
Payer: COMMERCIAL

## 2024-12-11 DIAGNOSIS — F33.3 MAJOR DEPRESSIVE DISORDER, RECURRENT, SEVERE WITH PSYCHOTIC FEATURES (HCC): ICD-10-CM

## 2024-12-11 DIAGNOSIS — F20.0 PARANOID SCHIZOPHRENIA (HCC): ICD-10-CM

## 2024-12-11 DIAGNOSIS — F41.1 GAD (GENERALIZED ANXIETY DISORDER): Primary | ICD-10-CM

## 2024-12-11 PROCEDURE — 90837 PSYTX W PT 60 MINUTES: CPT | Performed by: COUNSELOR

## 2024-12-11 RX ORDER — LURASIDONE HYDROCHLORIDE 80 MG/1
160 TABLET, FILM COATED ORAL
Qty: 60 TABLET | Refills: 1 | OUTPATIENT
Start: 2024-12-11

## 2024-12-11 RX ORDER — CITALOPRAM HYDROBROMIDE 20 MG/1
TABLET ORAL
Qty: 45 TABLET | Refills: 1 | OUTPATIENT
Start: 2024-12-11

## 2024-12-11 NOTE — PSYCH
"Behavioral Health Psychotherapy Progress Note    Psychotherapy Provided: Individual Psychotherapy     1. MARIBEL (generalized anxiety disorder)        2. Major depressive disorder, recurrent, severe with psychotic features (HCC)            Goals addressed in session: Goal 1     DATA: Ct and I could not connect through epic.  I had to call her instead. She complains that she is feeling anxious but for reason.  We discussed using her prescribed medication to give her some relief.  She is also experiencing nightmares.  We discussed putting an end to any dreams she can remember by writing an ending to them.  Ct has a difficult time vocalizing any specific goals.  She did agree to working with a  to help create some.  I will make a referral to our social work liaison.     During this session, this clinician used the following therapeutic modalities: Client-centered Therapy    Substance Abuse was not addressed during this session. If the client is diagnosed with a co-occurring substance use disorder, please indicate any changes in the frequency or amount of use: n/a. Stage of change for addressing substance use diagnoses: Pre-contemplation    ASSESSMENT:  Oksana Yuan presents with a Anxious mood.     her affect is Normal range and intensity and Flat, which is congruent, with her mood and the content of the session. The client has not made progress on their goals.     Oksana Yuan presents with a none risk of suicide, none risk of self-harm, and none risk of harm to others.    For any risk assessment that surpasses a \"low\" rating, a safety plan must be developed.    A safety plan was indicated: no  If yes, describe in detail n/a    PLAN: Between sessions, Oksana Yuan will receive and follow through with referral for . At the next session, the therapist will use Client-centered Therapy to address any issues she has with accepting other serices.    Behavioral Health Treatment Plan and " Discharge Planning: Oksana Yuan is aware of and agrees to continue to work on their treatment plan. They have identified and are working toward their discharge goals. yes    Depression Follow-up Plan Completed: Not applicable    Visit start and stop times:    12/11/24  Start Time: 1400

## 2024-12-21 DIAGNOSIS — F20.0 PARANOID SCHIZOPHRENIA (HCC): ICD-10-CM

## 2024-12-23 RX ORDER — LURASIDONE HYDROCHLORIDE 120 MG/1
120 TABLET, FILM COATED ORAL
Qty: 30 TABLET | Refills: 0 | Status: SHIPPED | OUTPATIENT
Start: 2024-12-23
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
## 2024-12-30 ENCOUNTER — TELEMEDICINE (OUTPATIENT)
Dept: PSYCHIATRY | Facility: CLINIC | Age: 39
End: 2024-12-30
Payer: COMMERCIAL

## 2024-12-30 DIAGNOSIS — F33.3 MAJOR DEPRESSIVE DISORDER, RECURRENT, SEVERE WITH PSYCHOTIC FEATURES (HCC): ICD-10-CM

## 2024-12-30 DIAGNOSIS — G47.00 INSOMNIA, UNSPECIFIED TYPE: ICD-10-CM

## 2024-12-30 DIAGNOSIS — F20.0 PARANOID SCHIZOPHRENIA (HCC): Primary | ICD-10-CM

## 2024-12-30 DIAGNOSIS — F41.1 GAD (GENERALIZED ANXIETY DISORDER): ICD-10-CM

## 2024-12-30 PROCEDURE — 99214 OFFICE O/P EST MOD 30 MIN: CPT

## 2024-12-30 RX ORDER — ZOLPIDEM TARTRATE 5 MG/1
5 TABLET ORAL
Qty: 30 TABLET | Refills: 0 | Status: SHIPPED | OUTPATIENT
Start: 2025-01-19

## 2024-12-30 RX ORDER — LURASIDONE HYDROCHLORIDE 20 MG/1
TABLET, FILM COATED ORAL
Qty: 42 TABLET | Refills: 0 | Status: SHIPPED | OUTPATIENT
Start: 2024-12-30

## 2024-12-30 RX ORDER — ALPRAZOLAM 1 MG/1
1 TABLET ORAL 3 TIMES DAILY PRN
Qty: 90 TABLET | Refills: 0 | Status: SHIPPED | OUTPATIENT
Start: 2025-01-19

## 2024-12-30 RX ORDER — HALOPERIDOL 1 MG/1
1 TABLET ORAL 2 TIMES DAILY
Qty: 60 TABLET | Refills: 1 | Status: SHIPPED | OUTPATIENT
Start: 2024-12-30

## 2024-12-30 NOTE — ASSESSMENT & PLAN NOTE
Variable   Continue Celexa 40 mg daily to help with depressive and anxiety. Option to try Trintellix in the future if needed.  Recommended continuation of outpatient therapy at [facility]

## 2024-12-30 NOTE — ASSESSMENT & PLAN NOTE
Variable   Increase to Haldol 3 mg BID to help with hallucinations. Patient aware that adding on this medication could interact with Latuda and they were informed of possible side effects. Patient will call the office with any concerns.   Decrease to Latuda 80 mg daily with a meal for about 1 week, then 60 mg for 1 week, 40 mg for 1 week, and 20 mg for 1 week, then stop due to lack of benefit   Orders:    lurasidone (LATUDA) 20 mg tablet; Take 3 tablets (60 mg) daily for 1 week, then 2 tablets (40 mg) daily for 1 week, then 1 tablet (20 mg) daily for 1 week, then stop    haloperidol (HALDOL) 1 mg tablet; Take 1 tablet (1 mg total) by mouth 2 (two) times a day Take with 2 mg tablet

## 2024-12-30 NOTE — ASSESSMENT & PLAN NOTE
Variable   Continue Xanax 1 mg TID as needed for times of anxiety. Will monitor for further titration.   Recommended continuation of outpatient therapy at [facility]  Orders:    ALPRAZolam (XANAX) 1 mg tablet; Take 1 tablet (1 mg total) by mouth 3 (three) times a day as needed for anxiety Do not start before January 19, 2025.

## 2024-12-30 NOTE — ASSESSMENT & PLAN NOTE
Stable   Continue Ambien 5 mg at bedtime as needed for insomnia  Orders:    zolpidem (AMBIEN) 5 mg tablet; Take 1 tablet (5 mg total) by mouth daily at bedtime as needed for sleep Do not start before January 19, 2025.
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
## 2025-01-28 ENCOUNTER — TELEMEDICINE (OUTPATIENT)
Dept: PSYCHIATRY | Facility: CLINIC | Age: 40
End: 2025-01-28
Payer: COMMERCIAL

## 2025-01-28 DIAGNOSIS — F33.3 MAJOR DEPRESSIVE DISORDER, RECURRENT, SEVERE WITH PSYCHOTIC FEATURES (HCC): ICD-10-CM

## 2025-01-28 DIAGNOSIS — F20.0 PARANOID SCHIZOPHRENIA (HCC): Primary | ICD-10-CM

## 2025-01-28 DIAGNOSIS — F41.1 GAD (GENERALIZED ANXIETY DISORDER): ICD-10-CM

## 2025-01-28 DIAGNOSIS — G47.00 INSOMNIA, UNSPECIFIED TYPE: ICD-10-CM

## 2025-01-28 DIAGNOSIS — F20.0 PARANOID SCHIZOPHRENIA (HCC): ICD-10-CM

## 2025-01-28 PROCEDURE — 99214 OFFICE O/P EST MOD 30 MIN: CPT

## 2025-01-28 RX ORDER — HALOPERIDOL 5 MG/1
5 TABLET ORAL 2 TIMES DAILY
Qty: 60 TABLET | Refills: 1 | Status: SHIPPED | OUTPATIENT
Start: 2025-01-28

## 2025-01-28 RX ORDER — ALPRAZOLAM 1 MG/1
1 TABLET ORAL 3 TIMES DAILY PRN
Qty: 90 TABLET | Refills: 0 | Status: SHIPPED | OUTPATIENT
Start: 2025-02-16

## 2025-01-28 RX ORDER — ZOLPIDEM TARTRATE 10 MG/1
10 TABLET ORAL
Qty: 30 TABLET | Refills: 0 | Status: SHIPPED | OUTPATIENT
Start: 2025-02-15

## 2025-01-28 RX ORDER — CITALOPRAM HYDROBROMIDE 40 MG/1
40 TABLET ORAL DAILY
Qty: 30 TABLET | Refills: 1 | Status: SHIPPED | OUTPATIENT
Start: 2025-01-28

## 2025-01-28 RX ORDER — LURASIDONE HYDROCHLORIDE 20 MG/1
TABLET, FILM COATED ORAL
Qty: 30 TABLET | Refills: 1 | OUTPATIENT
Start: 2025-01-28

## 2025-01-28 NOTE — ASSESSMENT & PLAN NOTE
Variable   Increase to Ambien 10 mg at bedtime as needed for insomnia  Increase to Haldol 5 mg BID to help with hallucinations  Continue Celexa 40 mg daily to help with depression and anxiety. Option to try Trintellix in the future if needed.  Recommended continuation of outpatient therapy at [facility]    Orders:    citalopram (CeleXA) 40 mg tablet; Take 1 tablet (40 mg total) by mouth daily

## 2025-01-28 NOTE — ASSESSMENT & PLAN NOTE
Variable   Continue Celexa 40 mg daily to help with depression and anxiety. Option to try Trintellix in the future if needed.  Continue Xanax 1 mg TID as needed for times of anxiety  Recommended continuation of outpatient therapy at [facility]    Orders:    ALPRAZolam (XANAX) 1 mg tablet; Take 1 tablet (1 mg total) by mouth 3 (three) times a day as needed for anxiety Do not start before February 16, 2025.    citalopram (CeleXA) 40 mg tablet; Take 1 tablet (40 mg total) by mouth daily

## 2025-01-28 NOTE — ASSESSMENT & PLAN NOTE
Variable   Increase to Ambien 10 mg at bedtime as needed for insomnia  Increase to Haldol 5 mg BID to help with hallucinations  Continue Celexa 40 mg daily to help with depression and anxiety. Option to try Trintellix in the future if needed.  Recommended continuation of outpatient therapy at [facility]    Orders:    haloperidol (HALDOL) 5 mg tablet; Take 1 tablet (5 mg total) by mouth 2 (two) times a day

## 2025-01-28 NOTE — ASSESSMENT & PLAN NOTE
Variable   Increase to Ambien 10 mg at bedtime as needed for insomnia    Orders:    zolpidem (AMBIEN) 10 mg tablet; Take 1 tablet (10 mg total) by mouth daily at bedtime as needed for sleep Do not start before February 15, 2025.
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
## 2025-03-04 ENCOUNTER — TELEPHONE (OUTPATIENT)
Dept: PSYCHIATRY | Facility: CLINIC | Age: 40
End: 2025-03-04

## 2025-03-04 ENCOUNTER — TELEMEDICINE (OUTPATIENT)
Dept: PSYCHIATRY | Facility: CLINIC | Age: 40
End: 2025-03-04
Payer: COMMERCIAL

## 2025-03-04 DIAGNOSIS — F41.1 GAD (GENERALIZED ANXIETY DISORDER): ICD-10-CM

## 2025-03-04 DIAGNOSIS — F20.0 PARANOID SCHIZOPHRENIA (HCC): Primary | ICD-10-CM

## 2025-03-04 DIAGNOSIS — G47.00 INSOMNIA, UNSPECIFIED TYPE: ICD-10-CM

## 2025-03-04 DIAGNOSIS — F33.3 MAJOR DEPRESSIVE DISORDER, RECURRENT, SEVERE WITH PSYCHOTIC FEATURES (HCC): ICD-10-CM

## 2025-03-04 PROCEDURE — 99214 OFFICE O/P EST MOD 30 MIN: CPT

## 2025-03-04 RX ORDER — ALPRAZOLAM 1 MG/1
1 TABLET ORAL 3 TIMES DAILY PRN
Qty: 90 TABLET | Refills: 0 | Status: SHIPPED | OUTPATIENT
Start: 2025-03-15

## 2025-03-04 RX ORDER — CITALOPRAM HYDROBROMIDE 20 MG/1
20 TABLET ORAL DAILY
Qty: 7 TABLET | Refills: 0 | Status: SHIPPED | OUTPATIENT
Start: 2025-03-04

## 2025-03-04 RX ORDER — ZOLPIDEM TARTRATE 10 MG/1
10 TABLET ORAL
Qty: 30 TABLET | Refills: 0 | Status: SHIPPED | OUTPATIENT
Start: 2025-03-15

## 2025-03-04 NOTE — ASSESSMENT & PLAN NOTE
Variable   Discontinue Celexa 40 mg daily by taking 20 mg daily for 1 week then stopping due to lack of benefit   Start Trintellix 10 mg daily to help with depression and anxiety   Recommended continuation of outpatient therapy at [facility], patient is considering a HUNTER     Orders:    citalopram (CeleXA) 20 mg tablet; Take 1 tablet (20 mg total) by mouth daily    vortioxetine (TRINTELLIX) 10 MG tablet; Take 1 tablet (10 mg total) by mouth daily

## 2025-03-04 NOTE — ASSESSMENT & PLAN NOTE
Variable   Continue Haldol 5 mg BID to help with hallucinations  Recommended continuation of outpatient therapy at [facility], patient is considering a HUNTER

## 2025-03-04 NOTE — ASSESSMENT & PLAN NOTE
Variable   Continue Ambien 10 mg at bedtime as needed for insomnia  Recommended continuation of outpatient therapy at [facility], patient is considering a HUNTER     Orders:    zolpidem (AMBIEN) 10 mg tablet; Take 1 tablet (10 mg total) by mouth daily at bedtime as needed for sleep Do not start before March 15, 2025.

## 2025-03-04 NOTE — ASSESSMENT & PLAN NOTE
Variable   Discontinue Celexa 40 mg daily by taking 20 mg daily for 1 week then stopping due to lack of benefit   Start Trintellix 10 mg daily to help with depression and anxiety   Continue Xanax 1 mg TID as needed for times of anxiety  Recommended continuation of outpatient therapy at [facility], patient is considering a HUNTER     Orders:    ALPRAZolam (XANAX) 1 mg tablet; Take 1 tablet (1 mg total) by mouth 3 (three) times a day as needed for anxiety Do not start before March 15, 2025.    citalopram (CeleXA) 20 mg tablet; Take 1 tablet (20 mg total) by mouth daily    vortioxetine (TRINTELLIX) 10 MG tablet; Take 1 tablet (10 mg total) by mouth daily

## 2025-03-05 NOTE — TELEPHONE ENCOUNTER
PA for Trintellix 10 MG tablets APPROVED     Date(s) approved 3/5/2025-3/5/2026    Case #    Patient advised by          []Doochoohart Message  [x]Phone call   []LMOM  []L/M to call office as no active Communication consent on file  []Unable to leave detailed message as VM not approved on Communication consent       Pharmacy advised by    [x]Fax  []Phone call  []Secure Chat    Specialty Pharmacy    []     Approval letter scanned into Media Yes

## 2025-03-05 NOTE — TELEPHONE ENCOUNTER
PA for Trintellix 10 MG tablets SUBMITTED to   [facility]    via    [x]CMM-KEY: [id number]  []Surescripts-Case ID #   []Availity-Auth ID # NDC #   []Faxed to plan   []Other website   []Phone call Case ID #     []PA sent as URGENT    All office notes, labs and other pertaining documents and studies sent. Clinical questions answered. Awaiting determination from insurance company.     Turnaround time for your insurance to make a decision on your Prior Authorization can take 7-21 business days.

## 2025-03-14 DIAGNOSIS — G47.00 INSOMNIA, UNSPECIFIED TYPE: ICD-10-CM

## 2025-03-17 RX ORDER — ZOLPIDEM TARTRATE 5 MG/1
5 TABLET ORAL
Qty: 30 TABLET | Refills: 0 | OUTPATIENT
Start: 2025-03-17

## 2025-03-17 NOTE — TELEPHONE ENCOUNTER
02/15/2025 01/28/2025 ALPRAZolam (Tablet) 90.0 30 1 MG NA [facility] [place] 0 [place]   1 [id number] 02/15/2025 01/28/2025 Zolpidem Tartrate (Tablet) 30.0 30 10 MG NA [facility] [place] / 0 [place]   1 [id number] 01/19/2025 12/30/2024 ALPRAZolam (Tablet) 90.0 30 1 MG NA [facility] [place] 0 [place]   1 [id number] 01/18/2025 12/30/2024 Zolpidem Tartrate (Tablet) 30.0 30 5 MG NA [facility] [place] / 0 [place]   1 [id number] 12/22/2024 11/25/2024 ALPRAZolam (Tablet) 90.0 30 1 MG NA [facility] [place] / 0 [place]   1 [id number] 12/22/2024 11/25/2024 Zolpidem Tartrate (Tablet) 30.0 30 5 MG NA [facility] [place] / 0 [place]   1 [id number] 11/25/2024 11/25/2024 ALPRAZolam (Tablet) 90.0 30 1 MG NA [facility] [place] / 0 [place]   1 [id number] 11/25/2024 11/25/2024 Zolpidem Tartrate (Tablet) 30.0 30 5 MG NA [facility] [place] / 0 [place]   1 [id number] 10/25/2024 10/25/2024 ALPRAZolam (Tablet) 90

## 2025-03-22 DIAGNOSIS — F20.0 PARANOID SCHIZOPHRENIA (HCC): ICD-10-CM

## 2025-03-24 RX ORDER — HALOPERIDOL 5 MG/1
5 TABLET ORAL 2 TIMES DAILY
Qty: 60 TABLET | Refills: 1 | Status: SHIPPED | OUTPATIENT
Start: 2025-03-24
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
## 2025-04-01 ENCOUNTER — TELEMEDICINE (OUTPATIENT)
Dept: PSYCHIATRY | Facility: CLINIC | Age: 40
End: 2025-04-01
Payer: COMMERCIAL

## 2025-04-01 DIAGNOSIS — F41.1 GAD (GENERALIZED ANXIETY DISORDER): ICD-10-CM

## 2025-04-01 DIAGNOSIS — G47.00 INSOMNIA, UNSPECIFIED TYPE: ICD-10-CM

## 2025-04-01 DIAGNOSIS — F20.0 PARANOID SCHIZOPHRENIA (HCC): Primary | ICD-10-CM

## 2025-04-01 DIAGNOSIS — F33.3 MAJOR DEPRESSIVE DISORDER, RECURRENT, SEVERE WITH PSYCHOTIC FEATURES (HCC): ICD-10-CM

## 2025-04-01 PROCEDURE — 99214 OFFICE O/P EST MOD 30 MIN: CPT

## 2025-04-01 RX ORDER — ZOLPIDEM TARTRATE 10 MG/1
10 TABLET ORAL
Qty: 30 TABLET | Refills: 0 | Status: SHIPPED | OUTPATIENT
Start: 2025-04-13

## 2025-04-01 RX ORDER — HALOPERIDOL 5 MG/1
TABLET ORAL
Qty: 90 TABLET | Refills: 1 | Status: SHIPPED | OUTPATIENT
Start: 2025-04-01

## 2025-04-01 RX ORDER — ALPRAZOLAM 1 MG/1
1 TABLET ORAL 3 TIMES DAILY PRN
Qty: 90 TABLET | Refills: 0 | Status: SHIPPED | OUTPATIENT
Start: 2025-04-13

## 2025-04-01 NOTE — ASSESSMENT & PLAN NOTE
Variable   Increase Haldol to 5 mg in the morning and 10 mg at bedtime to help with hallucinations   Recommended continuation of outpatient therapy at [facility], patient is considering a HUNTER     Orders:    haloperidol (HALDOL) 5 mg tablet; Take 1 tablet (5 mg) in the morning and 2 tablets (10 mg) at bedtime

## 2025-04-01 NOTE — ASSESSMENT & PLAN NOTE
Variable   Increase Haldol to 5 mg in the morning and 10 mg at bedtime to help with hallucinations  Continue Trintellix 10 mg daily to help with depression and anxiety   Continue Xanax 1 mg TID as needed for times of anxiety  Recommended continuation of outpatient therapy at [facility], patient is considering a HUNTER     Orders:    ALPRAZolam (XANAX) 1 mg tablet; Take 1 tablet (1 mg total) by mouth 3 (three) times a day as needed for anxiety Do not start before April 13, 2025.

## 2025-04-01 NOTE — ASSESSMENT & PLAN NOTE
Variable   Increase Haldol to 5 mg in the morning and 10 mg at bedtime to help with hallucinations  Continue Trintellix 10 mg daily to help with depression and anxiety   Recommended continuation of outpatient therapy at [facility] [place], patient is considering a HUNTER

## 2025-04-01 NOTE — ASSESSMENT & PLAN NOTE
Variable   Continue Ambien 10 mg at bedtime as needed for insomnia  Recommended continuation of outpatient therapy at [facility], patient is considering a HUNTER     Orders:    zolpidem (AMBIEN) 10 mg tablet; Take 1 tablet (10 mg total) by mouth daily at bedtime as needed for sleep Do not start before April 13, 2025.
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
## 2025-04-29 ENCOUNTER — TELEPHONE (OUTPATIENT)
Dept: PSYCHIATRY | Facility: CLINIC | Age: 40
End: 2025-04-29

## 2025-04-29 ENCOUNTER — TELEMEDICINE (OUTPATIENT)
Dept: PSYCHIATRY | Facility: CLINIC | Age: 40
End: 2025-04-29
Payer: COMMERCIAL

## 2025-04-29 DIAGNOSIS — F20.0 PARANOID SCHIZOPHRENIA (HCC): Primary | ICD-10-CM

## 2025-04-29 DIAGNOSIS — F33.3 MAJOR DEPRESSIVE DISORDER, RECURRENT, SEVERE WITH PSYCHOTIC FEATURES (HCC): ICD-10-CM

## 2025-04-29 DIAGNOSIS — F41.1 GAD (GENERALIZED ANXIETY DISORDER): ICD-10-CM

## 2025-04-29 DIAGNOSIS — G47.00 INSOMNIA, UNSPECIFIED TYPE: ICD-10-CM

## 2025-04-29 PROCEDURE — 99214 OFFICE O/P EST MOD 30 MIN: CPT

## 2025-04-29 RX ORDER — HALOPERIDOL 10 MG/1
10 TABLET ORAL 2 TIMES DAILY
Qty: 60 TABLET | Refills: 1 | Status: SHIPPED | OUTPATIENT
Start: 2025-04-29

## 2025-04-29 RX ORDER — ZOLPIDEM TARTRATE 10 MG/1
10 TABLET ORAL
Qty: 30 TABLET | Refills: 0 | Status: SHIPPED | OUTPATIENT
Start: 2025-05-10

## 2025-04-29 RX ORDER — ALPRAZOLAM 1 MG/1
1 TABLET ORAL 3 TIMES DAILY PRN
Qty: 90 TABLET | Refills: 0 | Status: SHIPPED | OUTPATIENT
Start: 2025-05-10

## 2025-04-29 NOTE — ASSESSMENT & PLAN NOTE
Variable   Increase Haldol to 10 mg in the morning and 10 mg at bedtime to help with hallucinations  Continue Trintellix 10 mg daily to help with depression and anxiety   Continue Xanax 1 mg TID as needed for times of anxiety  Recommended continuation of outpatient therapy at [facility], patient is considering a HUNTER     Orders:    ALPRAZolam (XANAX) 1 mg tablet; Take 1 tablet (1 mg total) by mouth 3 (three) times a day as needed for anxiety Do not start before May 10, 2025.    vortioxetine (TRINTELLIX) 10 MG tablet; Take 1 tablet (10 mg total) by mouth daily

## 2025-04-29 NOTE — ASSESSMENT & PLAN NOTE
Improving    Continue Trintellix 10 mg daily to help with depression and anxiety   Continue Ambien 10 mg at bedtime as needed for insomnia  Recommended continuation of outpatient therapy at [facility], patient is considering a HUNTER     Orders:    vortioxetine (TRINTELLIX) 10 MG tablet; Take 1 tablet (10 mg total) by mouth daily

## 2025-04-29 NOTE — ASSESSMENT & PLAN NOTE
Variable   Increase Haldol to 10 mg in the morning and 10 mg at bedtime to help with hallucinations  Continue Trintellix 10 mg daily to help with depression and anxiety   Continue Ambien 10 mg at bedtime as needed for insomnia  Continue Xanax 1 mg TID as needed for times of anxiety  Recommended continuation of outpatient therapy at [facility], patient is considering a HUNTER     Orders:    haloperidol (HALDOL) 10 mg tablet; Take 1 tablet (10 mg total) by mouth 2 (two) times a day

## 2025-04-29 NOTE — BH TREATMENT PLAN
TREATMENT PLAN (Medication Management Only)        [facility]    Name and Date of Birth:  Oksana Yuan 39 y.o. 1985  MRN: [id number]  Date of Treatment Plan: April 29, 2025  Diagnosis/Diagnoses:    1. Paranoid schizophrenia (HCC)    2. Major depressive disorder, recurrent, severe with psychotic features (HCC)    3. MARIBEL (generalized anxiety disorder)      Strengths/Personal Resources for Self-Care: supportive friends, taking medications as prescribed.  Area/Areas of need (in own words): anxiety, depression, hallucinations  1. Long Term Goal:   lessen hallucinations, maintain acceptable level of anxiety.  Target Date:6 months - 10/29/2025  Person/Persons responsible for completion of goal: Oksana  2.  Short Term Objective (s) - How will we reach this goal?:   A.  Provider new recommended medication/dosage changes and/or continue medication(s): continue current medications as prescribed.  B.  Attend medication management appointments regularly..  C.  N/A.  Target Date:6 months - 10/29/2025  Person/Persons Responsible for Completion of Goal: Oksana  Progress Towards Goals: continuing treatment  Treatment Modality: medication management every 1 months  Review due 180 days from date of this plan: 6 months - 10/29/2025  Expected length of service: ongoing treatment unless revised  My Physician/PA/NP and I have developed this plan together and I agree to work on the goals and objectives. I understand the treatment goals that were developed for my treatment.   Electronic Signatures: on file (unless signed below)    Yani Plaza PA-C 04/29/25

## 2025-04-29 NOTE — TELEPHONE ENCOUNTER
Writer called client as she was having connection issues to the appointment on the MyChart. Client was trying to connect via link. Writer asked client to try logging into the MyChart directly and joining the appointment from there. That method worked.    Client is aware of [facility] NP and VC consent forms due for next appointment.

## 2025-05-29 ENCOUNTER — TELEPHONE (OUTPATIENT)
Dept: PSYCHIATRY | Facility: CLINIC | Age: 40
End: 2025-05-29

## 2025-05-29 NOTE — TELEPHONE ENCOUNTER
Spoke to client about signing the Virtual Care Behavioral Health Consent in [employer] prior to appointment with Yani Plaza on 6/2/2025.
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
## 2025-06-02 ENCOUNTER — TELEMEDICINE (OUTPATIENT)
Dept: PSYCHIATRY | Facility: CLINIC | Age: 40
End: 2025-06-02
Payer: COMMERCIAL

## 2025-06-02 DIAGNOSIS — F33.3 MAJOR DEPRESSIVE DISORDER, RECURRENT, SEVERE WITH PSYCHOTIC FEATURES (HCC): Primary | ICD-10-CM

## 2025-06-02 DIAGNOSIS — G47.00 INSOMNIA, UNSPECIFIED TYPE: ICD-10-CM

## 2025-06-02 DIAGNOSIS — F20.0 PARANOID SCHIZOPHRENIA (HCC): ICD-10-CM

## 2025-06-02 DIAGNOSIS — F41.1 GAD (GENERALIZED ANXIETY DISORDER): ICD-10-CM

## 2025-06-02 PROCEDURE — 99214 OFFICE O/P EST MOD 30 MIN: CPT

## 2025-06-02 RX ORDER — HALOPERIDOL 5 MG/1
5 TABLET ORAL
Qty: 30 TABLET | Refills: 1 | Status: SHIPPED | OUTPATIENT
Start: 2025-06-02

## 2025-06-02 RX ORDER — ALPRAZOLAM 1 MG/1
1 TABLET ORAL 3 TIMES DAILY PRN
Qty: 90 TABLET | Refills: 0 | Status: SHIPPED | OUTPATIENT
Start: 2025-06-06

## 2025-06-02 RX ORDER — ZOLPIDEM TARTRATE 10 MG/1
10 TABLET ORAL
Qty: 30 TABLET | Refills: 0 | Status: SHIPPED | OUTPATIENT
Start: 2025-06-06

## 2025-06-02 NOTE — ASSESSMENT & PLAN NOTE
Generally stable   Continue Ambien 10 mg at bedtime as needed for insomnia    Orders:    zolpidem (AMBIEN) 10 mg tablet; Take 1 tablet (10 mg total) by mouth daily at bedtime as needed for sleep Do not start before June 6, 2025.

## 2025-06-02 NOTE — ASSESSMENT & PLAN NOTE
Variable   Increase Haldol to 10 mg in the morning and 15 mg at bedtime to help with hallucinations  Continue Trintellix 10 mg daily to help with depression and anxiety   Continue Xanax 1 mg TID as needed for times of anxiety  Recommended continuation of outpatient therapy at [facility], patient is considering a HUNTER       Orders:    ALPRAZolam (XANAX) 1 mg tablet; Take 1 tablet (1 mg total) by mouth 3 (three) times a day as needed for anxiety Do not start before June 6, 2025.

## 2025-06-02 NOTE — ASSESSMENT & PLAN NOTE
Improving    Continue Trintellix 10 mg daily to help with depression and anxiety   Continue Ambien 10 mg at bedtime as needed for insomnia  Recommended continuation of outpatient therapy at [facility], patient is considering a HUNTER

## 2025-06-02 NOTE — ASSESSMENT & PLAN NOTE
Variable   Increase Haldol to 10 mg in the morning and 15 mg at bedtime to help with hallucinations  Continue Trintellix 10 mg daily to help with depression and anxiety   Continue Ambien 10 mg at bedtime as needed for insomnia  Continue Xanax 1 mg TID as needed for times of anxiety  Recommended continuation of outpatient therapy at [facility], patient is considering a HUNTER       Orders:    haloperidol (HALDOL) 5 mg tablet; Take 1 tablet (5 mg total) by mouth daily at bedtime Take with 10 mg tablet at bedtime for total dose of 15 mg at bedtime

## 2025-06-05 ENCOUNTER — TELEPHONE (OUTPATIENT)
Dept: PSYCHIATRY | Facility: CLINIC | Age: 40
End: 2025-06-05

## 2025-06-11 ENCOUNTER — DOCUMENTATION (OUTPATIENT)
Dept: BEHAVIORAL/MENTAL HEALTH CLINIC | Facility: CLINIC | Age: 40
End: 2025-06-11

## 2025-06-11 DIAGNOSIS — F20.9 SCHIZOPHRENIA, UNSPECIFIED TYPE (HCC): Primary | ICD-10-CM

## 2025-06-11 DIAGNOSIS — F33.3 MAJOR DEPRESSIVE DISORDER, RECURRENT, SEVERE WITH PSYCHOTIC FEATURES (HCC): ICD-10-CM

## 2025-06-11 NOTE — PROGRESS NOTES
Psychotherapy Discharge Summary    Preferred Name: Oksana Yuan  YOB: 1985    Admission date to psychotherapy: 11/06/24    Referred by: n/a    Presenting Problem: Ct stated they needed someone to talk to.  Diagnosed Schizophrenic    Course of treatment included : medication management and individual therapy     Progress/Outcome of Treatment Goals (brief summary of course of treatment) Ct struggled through few sessions that he attended.  Referral was made for extended services but ct did not follow through and failed to make follow up appointments with this clinician.     Treatment Complications (if any): Ct AMA    Treatment Progress: poor    Current [facility] Psychiatric Provider: dr. wagner    Discharge Medications include: xanax, haldol, ambien    Discharge Date: 6/11/25    Discharge Diagnosis:   1. Schizophrenia, unspecified type (HCC)        2. Major depressive disorder, recurrent, severe with psychotic features (HCC)            Criteria for Discharge: demonstrated failure to uphold their treatment plan/contract    Patient is not cleared to return to [facility] for continued treatment.    Rationale: Ct did not respond to CBT or therapists efforts to get them additional supports.    Aftercare recommendations include (include specific referral names and phone numbers, if appropriate): n/a    Prognosis: poor

## 2025-06-16 ENCOUNTER — TELEPHONE (OUTPATIENT)
Dept: BEHAVIORAL/MENTAL HEALTH CLINIC | Facility: CLINIC | Age: 40
End: 2025-06-16

## 2025-06-16 NOTE — TELEPHONE ENCOUNTER
Therapist called, briefly introduced self and shared received message that Oksana called and requested her appointment on 6/19 to be switched to virtual. Therapist let her know that therapist updated the appointment to virtual. Oksana thanked therapist and therapist shared looking forward to meeting her soon. Call then ended.

## 2025-06-18 DIAGNOSIS — Z79.899 ENCOUNTER FOR LONG-TERM (CURRENT) USE OF MEDICATIONS: Primary | ICD-10-CM

## 2025-06-19 ENCOUNTER — TELEMEDICINE (OUTPATIENT)
Dept: BEHAVIORAL/MENTAL HEALTH CLINIC | Facility: CLINIC | Age: 40
End: 2025-06-19
Payer: COMMERCIAL

## 2025-06-19 DIAGNOSIS — F20.0 PARANOID SCHIZOPHRENIA (HCC): ICD-10-CM

## 2025-06-19 DIAGNOSIS — F41.1 GAD (GENERALIZED ANXIETY DISORDER): ICD-10-CM

## 2025-06-19 DIAGNOSIS — F33.3 MAJOR DEPRESSIVE DISORDER, RECURRENT, SEVERE WITH PSYCHOTIC FEATURES (HCC): Primary | ICD-10-CM

## 2025-06-19 PROCEDURE — 90837 PSYTX W PT 60 MINUTES: CPT | Performed by: COUNSELOR

## 2025-06-19 NOTE — BH TREATMENT PLAN
Outpatient Behavioral Health Psychotherapy Treatment Plan    Oksana Yuan  1985     Date of Initial Psychotherapy Assessment: 11/6/2024   Date of Current Treatment Plan: 06/19/25  Treatment Plan Target Date: TBD  Treatment Plan Expiration Date: 12/19/2025    Diagnosis:   1. Major depressive disorder, recurrent, severe with psychotic features (HCC)        2. MARIBEL (generalized anxiety disorder)        3. Paranoid schizophrenia (HCC)            Area(s) of Need: anxiety and depression    Long Term Goal 1 (in the client's own words): I would like to be able to relax more    Stage of Change: Preparation    Target Date for completion: TBD     Anticipated therapeutic modalities: CBT, Mindfulness     People identified to complete this goal: myself, therapist      Objective 1: (identify the means of measuring success in meeting the objective): I will work on identifying at least 1-3 coping skills for anxiety.      Objective 2: (identify the means of measuring success in meeting the objective): I will work on practicing at least 1-3 coping skills during therapy appointments and at home at least once a day.       Long Term Goal 2 (in the client's own words): I want to improve taking care of myself    Stage of Change: Preparation    Target Date for completion: TBD     Anticipated therapeutic modalities: Mindfulness, Client centered and supportive therapy     People identified to complete this goal: myself, therapist      Objective 1: (identify the means of measuring success in meeting the objective): I work on identifying strategies to increase motivation to support engagement in self care and daily tasks.      Objective 2: (identify the means of measuring success in meeting the objective): I will create a plan during therapy appointments for self care.        I am currently under the care of a [facility] psychiatric provider: yes    My [facility] psychiatric provider is: Yani Plaza    I am currently taking psychiatric  medications: Yes, as prescribed    I feel that I will be ready for discharge from mental health care when I reach the following (measurable goal/objective): I will be able to complete self-care and daily tasks at least 50% of time. I will be able to manage my anxiety using coping skills at least 50% of the time.     For children and adults who have a legal guardian:   Has there been any change to custody orders and/or guardianship status? N/A. If yes, attach updated documentation.    I have created my Crisis Plan and have been offered a copy of this plan    Behavioral Health Treatment Plan [place]: Diagnosis and Treatment Plan explained to Oksana Yuan acknowledges an understanding of their diagnosis. Oksana Yuan agrees to this treatment plan.    I have been offered a copy of this Treatment Plan. yes

## 2025-06-19 NOTE — PSYCH
Virtual Regular VisitName: Oksana Yuan      : 1985      MRN: [id number]  Encounter Provider: Emmy Davis  Encounter Date: 2025   Encounter department: [facility] THERAPIST MENTAL HEALTH OUTPATIENT  :  Assessment & Plan  Major depressive disorder, recurrent, severe with psychotic features (HCC)         MARIBEL (generalized anxiety disorder)         Paranoid schizophrenia (HCC)           Goals addressed in session: Goal 1     DATA: Therapist met with Oksana on Epic platform to start first individual therapy appointment since transfer. Oksana stated that she could not hear therapist, therapist made some adjustments to settings then Oksana shared she could hear therapist a little bit. Therapist wondered if Oksana's volume was turned up. After some attempts to resolve the situation, therapist explained to Oksana will send another link for Slacker. Oksana joined and reported being able to hear therapist better. 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 Therapist invited Oksana to ask any questions and she replied that she did not have any. Therapist invited Oksana to talk about her goals for therapy and what she hopes to gain or change. Oksana shared she struggles with anxiety, depression and has episodic schizophrenia, hallucinations. Therapist asked Oksana what has worked before with hallucinations and she reported sleep. Therapist briefly reviewed the importance of sleep on both physical and mental health. Oksana shared that she has found talking with a therapist has helped her. Therapist asked Oksana about her interests and what she likes to do for enjoyment. She shared that she plays the guitar and likes progressive, rock, metal music. She continued to share that she lives with two roommates, Pascual and Clarita whom she  described as supportive. Also, talked about a previous relationship with her girlfriend that ended in June. Oksana shared that she does not have a working vehicle, however is now able to leave the house. Therapist invited Oksana to complete the treatment and safety plan. Oksana did not make any changes or additions to her safety plan. Therapist asked Oksana about suicidal ideation and plan. She denied thoughts and plan of self-harm. Therapist asked her about her last substance use and she reported last use to be many years ago. Therapist highlighted Oksana's progress. The treatment plan Oksana identified long term goals of wanting to be able to relax more and take care of herself (ex. Daily activities, self-care). Oksana shared that she feels like an episode is about to come on. Therapist asked her what that means. She explained will feel confusion, disoriented, and have hallucinations. Therapist asked Oksana what has helped in the past and she responded taking medication. Therapist asked Oksana if she took her medication today and she responded that she did not. Therapist invited Oksana to take her medication now while she was thinking about it. Therapist observed Oksana get up and walk into another room appearing to get her medication. The video then disconnected. Therapist called Oksana to complete appointment and she answered. She reported taking her medication. Therapist and Oksana worked together to complete a plan for the rest of the day and week/weekend. Plan discussed/created was take medications as prescribed, draw, play guitar, listen to music, take a walk early evening hours if temperature is cooler, reach out to roommates for support, contact Peer Talk Line, reach out to therapist if needed, go to Emergency Room if symptoms increase. Therapist offered to email Oksana Peer Talk Line and she agreed. Therapist noted to Oksana that email was coming up as undeliverable with message email  "blocked by recipient. She responded that was strange and therapist suggested checking email settings in the future. Oksana then took the number down on paper repeating it back to therapist. Therapist invited Oksana to engage in a mindful breathing exercise. After completing therapist talked with her about her experience. She replied that she felt more relaxed/calm. Therapist reviewed plan again for rest of day/week/weekend and invited Oksana to schedule next appointment. She requested to meet bi-weekly.   During this session, this clinician used the following therapeutic modalities: Engagement Strategies, Client-centered Therapy, Mindfulness-based Strategies, and Supportive Psychotherapy    Substance Abuse was addressed during this session. If the client is diagnosed with a co-occurring substance use disorder, please indicate any changes in the frequency or amount of use: No changes. Stage of change for addressing substance use diagnoses: Maintenance    ASSESSMENT:  Oksana presents with a Anxious mood. Sharans affect is Normal range and intensity, which is congruent, with their mood and the content of the session. The client has made progress on their goals as evidenced by creating and updating treatment and safety plan. Oksana identified strategies she can implement to minimize her anxiety and hallucinations.    Oksana presents with a none risk of suicide, none risk of self-harm, and none risk of harm to others.    For any risk assessment that surpasses a \"low\" rating, a safety plan must be developed.    A safety plan was indicated: yes  If yes, describe in detail Safety plan update was due and completed. Also,Okasna expressed concerns of feeling an episode coming on for when she will feel confusion, disoriented and is an onset for hallucinations. She reported taking her medication while in appointment with therapist as client stated medication is helpful with preventing/managing episodes. Therapist " updated crisis plan with Oksana, therapist provided Peer Line number for Oksana to contact if needed. Oksana and therapist created plan for rest of the day and week/weekend as follows: continue to take medication as prescribed, draw, play guitar, listen to music, take a walk early evening hours if temperature is cooler, reach out to roommates for support, contact Peer Talk Line, reach out to therapist if needed, go to Emergency Room if symptoms increase.     PLAN: Between sessions, Oksana will follow her safety plan and contact peer line or go to the emergency room if symptoms increase.Therapist will also send Oksana a message through My Chart with Peer Line number and mobile crisis to have for reference. At the next session, the therapist will use Client-centered Therapy, Cognitive Behavioral Therapy, Mindfulness-based Strategies, and Supportive Psychotherapy to address anxiety and depression.     Behavioral Health Treatment Plan [facility]: Diagnosis and Treatment Plan explained to Oksana, Oksana relates understanding diagnosis and is agreeable to Treatment Plan. Yes     Depression Follow-up Plan Completed: Not applicable     Reason for visit is   Chief Complaint   Patient presents with    Virtual Regular Visit    Virtual Regular Visit      Recent Visits  Date Type Provider Dept   06/19/25 Telemedicine [facility] Therapist op   06/16/25 Telephone [facility] Therapist [facility]   Showing recent visits within past 7 days and meeting all other requirements  Future Appointments  No visits were found meeting these conditions.  Showing future appointments within next 150 days and meeting all other requirements     History of Present Illness     HPI    Past Medical History   Past Medical History[1]  Past Surgical History[2]  Current Outpatient Medications   Medication Instructions    ALPRAZolam (XANAX) 1 mg, Oral, 3 times daily PRN    estradiol valerate (DELESTROGEN) 40  MG/ML injection     haloperidol (HALDOL) 10 mg, Oral, 2 times daily    haloperidol (HALDOL) 5 mg, Oral, Daily at bedtime, Take with 10 mg tablet at bedtime for total dose of 15 mg at bedtime    Progesterone 200 MG CAPS     vortioxetine (TRINTELLIX) 10 mg, Oral, Daily    zolpidem (AMBIEN) 10 mg, Oral, Daily at bedtime PRN     Allergies[3]    Objective   There were no vitals taken for this visit.    Video Exam  Physical Exam     Administrative Statements   Encounter provider Emmy Davis    The Patient is located at Home and in the following state in which I hold an active license [place].    The patient was identified by name and date of birth. Oksana Yuan was informed that this is a telemedicine visit and that the visit is being conducted through the Avieon platform. She agrees to proceed..  My office door was closed. No one else was in the room.  She acknowledged consent and understanding of privacy and security of the video platform. The patient has agreed to participate and understands they can discontinue the visit at any time.      Visit Time  Start Time: 1100  Stop Time: 1204  Total Visit Time: 64 minutes         [1] No past medical history on file.  [2] No past surgical history on file.  [3]   Allergies  Allergen Reactions    Penicillins Anaphylaxis

## 2025-06-20 NOTE — BH CRISIS PLAN
Client Name: Oksana Yuan       Client YOB: 1985    [facility]      Creation Date: 6/19/25 Update Date: 6/19/26   Created By: Emmy Davis Last Updated By: Emmy Davis      Step 1: Warning Signs:   Warning Signs   Stop taking care of myself   Destructive to environment and self   Increased self harming   Substance use increased            Step 2: Internal Coping Strategies:   Internal Coping Strategies   Go for a walk and get fresh air   Play guitar or listen to music   Watch tv or a show   Writing or drawing            Step 3: People and social settings that provide distraction:   Name Contact Information   Roommate (Pascual) number in cell phone   Roommate (Clarita) number in cell phone            Step 4: People whom I can ask for help during a crisis:      Name Contact Information    Roommate (Pascual) number in cell phone    Roommate (Clarita) number in cell phone      Step 5: Professionals or agencies I can contact during a crisis:      Clinican/Agency Name Phone Emergency Contact    [employer] PA-C [phone]     amy Cochran Email: [email or web address]       Local Emergency Department Emergency Department Phone Emergency Department Address    911          Crisis Phone Numbers:   Suicide Prevention Lifeline: Call or Text  988 Crisis Text Line: Text HOME to 454-081   Please note: Some [facility] do not have a separate number for Child/Adolescent specific crisis. If your county is not listed under Child/Adolescent, please call the adult number for your county      Adult Crisis Numbers: Child/Adolescent Crisis Numbers   [facility]: [phone] [facility]: [phone]   [facility]: [phone] [facility]: [phone]   [facility]: [phone] [place]: [phone]   [facility]: [phone] [place]: [phone]   [place] [facility]: [phone]   [facility]: [phone]   [facility]:  [phone]   [facility]: [phone] (daytime) [phone] (after hours, weekends, holidays)      Step 6: Making the environment safer (plan for lethal means safety):   Patient did not identify any lethal methods: Yes     Optional: What is most important to me and worth living for?   The people that are in my life      [place] Safety Plan. Kailyn Mckeon and Curry Adkins. Used with permission of the authors.

## 2025-06-20 NOTE — PSYCH
"Virtual Regular VisitName: Oksana Yuan      : 1985      MRN: [id number]  Encounter Provider: Emmy Davis  Encounter Date: 2025   Encounter department: [facility] THERAPIST MENTAL HEALTH OUTPATIENT  :  Assessment & Plan  Major depressive disorder, recurrent, severe with psychotic features (HCC)         MARIBEL (generalized anxiety disorder)         Paranoid schizophrenia (HCC)               Goals addressed in session: {GOALS:98294}     DATA: ***  During this session, this clinician used the following therapeutic modalities: {Therapeutic Modalities:52151}    Substance Abuse {Was/was not:55265} addressed during this session. If the client is diagnosed with a co-occurring substance use disorder, please indicate any changes in the frequency or amount of use: ***. Stage of change for addressing substance use diagnoses: {Psych Substance Abuse Stage of Change:23137}    ASSESSMENT:  Oksana presents with a {Psych/BH Mood:14963} mood. Sharans affect is {Psych/BH Affect:89755}, which is {Congruent/Non Congruent:79393}, with their mood and the content of the session. The client {HAS/HAS NOT:} made progress on their goals as evidenced by ***.    Oksana presents with a {PSYCH Suicide/Homicide Risk:27517} risk of suicide, {PSYCH Suicide/Homicide Risk:89251} risk of self-harm, and {PSYCH Suicide/Homicide Risk:69572} risk of harm to others.    For any risk assessment that surpasses a \"low\" rating, a safety plan must be developed.    A safety plan was indicated: {YES/NO:}  If yes, describe in detail ***    PLAN: Between sessions, Oksana will ***. At the next session, the therapist will use {Therapeutic Modalities:90534} to address ***.    Behavioral Health Treatment Plan [facility]: Diagnosis and Treatment Plan explained to Oksana, Oksana relates understanding diagnosis and is agreeable to Treatment Plan. {YES (DEF)/NO:26097}    Depression Follow-up Plan Completed: {YES/NO/NOT " APPLICABLE:184400817}     Reason for visit is   Chief Complaint   Patient presents with   • Virtual Regular Visit   • Virtual Regular Visit      Recent Visits  Date Type Provider Dept   06/19/25 Telemedicine Emmy Davis [facility] Therapist [facility]   06/16/25 Telephone Emmy Davis [facility] Therapist [facility]   Showing recent visits within past 7 days and meeting all other requirements  Future Appointments  No visits were found meeting these conditions.  Showing future appointments within next 150 days and meeting all other requirements     History of Present Illness {?Quick Links Encounters * My Last Note * Last Note in Specialty * Snapshot * Since Last Visit * History :64775}    HPI    Past Medical History   No past medical history on file.  No past surgical history on file.  Current Outpatient Medications   Medication Instructions   • ALPRAZolam (XANAX) 1 mg, Oral, 3 times daily PRN   • estradiol valerate (DELESTROGEN) 40 MG/ML injection    • haloperidol (HALDOL) 10 mg, Oral, 2 times daily   • haloperidol (HALDOL) 5 mg, Oral, Daily at bedtime, Take with 10 mg tablet at bedtime for total dose of 15 mg at bedtime   • Progesterone 200 MG CAPS    • vortioxetine (TRINTELLIX) 10 mg, Oral, Daily   • zolpidem (AMBIEN) 10 mg, Oral, Daily at bedtime PRN     Allergies   Allergen Reactions   • Penicillins Anaphylaxis       Objective {?Quick Links Trend Vitals * Enter New Vitals * Results Review * Timeline (Adult) * Labs * Imaging * Cardiology * Procedures * Lung Cancer Screening * Surgical eConsent :69361}  There were no vitals taken for this visit.    Video Exam  Physical Exam     Administrative Statements   Encounter provider Emmy Davis    The Patient is located at {[facility] Virtual Patient Location:[address]} and in the following state in which I hold an active license {[facility] virtual patient location:[address]}.    The patient was identified by name and date of birth. Oksana Yuan was informed that this is a  telemedicine visit and that the visit is being conducted through {AMB VIRTUAL VISIT MEDIUM:39695}.  {Telemedicine confidentiality :68862} {Telemedicine participants:67095}  She acknowledged consent and understanding of privacy and security of the video platform. The patient has agreed to participate and understands they can discontinue the visit at any time.    I have spent a total time of *** minutes in caring for this patient on the day of the visit/encounter including {AMB Counseling Topics:1054636784}, not including the time spent for establishing the audio/video connection.    Visit Time  Start Time: 1100  Stop Time: 1204  Total Visit Time: 64 minutes

## 2025-06-30 ENCOUNTER — TELEMEDICINE (OUTPATIENT)
Dept: BEHAVIORAL/MENTAL HEALTH CLINIC | Facility: CLINIC | Age: 40
End: 2025-06-30
Payer: COMMERCIAL

## 2025-06-30 DIAGNOSIS — F41.1 GAD (GENERALIZED ANXIETY DISORDER): ICD-10-CM

## 2025-06-30 DIAGNOSIS — F33.3 MAJOR DEPRESSIVE DISORDER, RECURRENT, SEVERE WITH PSYCHOTIC FEATURES (HCC): Primary | ICD-10-CM

## 2025-06-30 DIAGNOSIS — F20.0 PARANOID SCHIZOPHRENIA (HCC): ICD-10-CM

## 2025-06-30 PROCEDURE — 90834 PSYTX W PT 45 MINUTES: CPT | Performed by: COUNSELOR

## 2025-06-30 NOTE — PSYCH
Virtual Regular VisitName: Oksana Yuan      : 1985      MRN: [id number]  Encounter Provider: Emmy Davis  Encounter Date: 2025   Encounter department: [facility] THERAPIST MENTAL HEALTH OUTPATIENT  :  Assessment & Plan  Major depressive disorder, recurrent, severe with psychotic features (HCC)         MARIBEL (generalized anxiety disorder)         Paranoid schizophrenia (HCC)             Goals addressed in session: Goal 1     DATA: Therapist met with Oksana for individual therapy session via Quincee platform. Therapist started with a brief check in. Oksana stated she is doing okay. Therapist followed up from last appointment and Oksana stated that she did not feel any episodes coming on today. She reported feeling anxious and was not able to identify any triggers for anxiety. Therapist invited Oksana to participate in a a grounding exercise (5-4-3-2-1) and progressive muscle relation exercise. Therapist explained directions for both activities then led the activity. Therapist observed Oksana participate in both exercises. Therapist invited Oksana to share her experience. She stated that she felt more relaxed/calm. Therapist explained how grounding techniques help us to bring focus to the present moment by using our senses as an anchor and progressive relaxation uses the tensing and relaxing of our body/muscles to support grounding. Therapist encouraged her to practice one or both of these activities at least 1-3 times a week. Oksana shared that she is open to practicing them. Therapist asked Oksana questions regarding daily tasks. She shared the biggest challenge is doing her laundry. Therapist asked her what gets in the way and she replied money. Oksana acknowledged other than rent that cigarettes take up the rest of her money. Therapist asked how many cigarettes she smokes a week. She stated she smokes up to 5 packs a week. Therapist asked about cost and she shared it is  "about $11 for a pack of cigarettes and $6 or $7 for laundry. Therapist wondered if she could go from 5 to 4 packs a week and use that money for laundry. Oksana shared that she could try. Therapist invited Oksana to share anything she would like to talk about and she replied that she did not have anything for today. Therapist then invited Oksana to schedule future appointments.   During this session, this clinician used the following therapeutic modalities: Client-centered Therapy, Mindfulness-based Strategies, and Supportive Psychotherapy    Substance Abuse was not addressed during this session. If the client is diagnosed with a co-occurring substance use disorder, please indicate any changes in the frequency or amount of use: N/A. Stage of change for addressing substance use diagnoses: No substance use/Not applicable    ASSESSMENT:  Oksana presents with a Euthymic/ normal and Anxious mood. Sharans affect is Normal range and intensity, which is congruent, with their mood and the content of the session. The client has made progress on their goals as evidenced by practicing grounding and progressive muscle relaxation therapy to support managing anxiety.    Oksana presents with a none risk of suicide, none risk of self-harm, and none risk of harm to others.    For any risk assessment that surpasses a \"low\" rating, a safety plan must be developed.    A safety plan was indicated: no  If yes, describe in detail N/A    PLAN: Between sessions, Oksana will practice grounding and/or progressive muscle relaxation at least 1-3 times a week. At the next session, the therapist will use Client-centered Therapy, Cognitive Behavioral Therapy, Mindfulness-based Strategies, and Supportive Psychotherapy to address identifying and practicing coping strategies and creating a plan for self-care.    Behavioral Health Treatment Plan [facility]: Diagnosis and Treatment Plan explained to Oksana, Oksana relates understanding diagnosis " and is agreeable to Treatment Plan. Yes     Depression Follow-up Plan Completed: Not applicable     Reason for visit is No chief complaint on file.     Recent Visits  No visits were found meeting these conditions.  Showing recent visits within past 7 days and meeting all other requirements  Today's Visits  Date Type Provider Dept   06/30/25 Telemedicine Emmy Davis [facility] Therapist [facility]   Showing today's visits and meeting all other requirements  Future Appointments  No visits were found meeting these conditions.  Showing future appointments within next 150 days and meeting all other requirements     History of Present Illness     HPI    Past Medical History   Past Medical History[1]  Past Surgical History[2]  Current Outpatient Medications   Medication Instructions    ALPRAZolam (XANAX) 1 mg, Oral, 3 times daily PRN    estradiol valerate (DELESTROGEN) 40 MG/ML injection     haloperidol (HALDOL) 10 mg, Oral, 2 times daily    haloperidol (HALDOL) 5 mg, Oral, Daily at bedtime, Take with 10 mg tablet at bedtime for total dose of 15 mg at bedtime    Progesterone 200 MG CAPS     vortioxetine (TRINTELLIX) 10 mg, Oral, Daily    zolpidem (AMBIEN) 10 mg, Oral, Daily at bedtime PRN     Allergies[3]    Objective   There were no vitals taken for this visit.    Video Exam  Physical Exam     Administrative Statements   Encounter provider Emmy Davis    The Patient is located at Home and in the following state in which I hold an active license [place].    The patient was identified by name and date of birth. Oksana Yuan was informed that this is a telemedicine visit and that the visit is being conducted through the Epic Embedded platform. She agrees to proceed..  My office door was closed. No one else was in the room.  She acknowledged consent and understanding of privacy and security of the video platform. The patient has agreed to participate and understands they can discontinue the visit at any time.      Visit  Time  Start Time: 1601  Stop Time: 1644  Total Visit Time: 43 minutes       [1] No past medical history on file.  [2] No past surgical history on file.  [3]   Allergies  Allergen Reactions    Penicillins Anaphylaxis
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
## 2025-07-01 ENCOUNTER — TELEMEDICINE (OUTPATIENT)
Dept: PSYCHIATRY | Facility: CLINIC | Age: 40
End: 2025-07-01
Payer: COMMERCIAL

## 2025-07-01 DIAGNOSIS — G47.00 INSOMNIA, UNSPECIFIED TYPE: ICD-10-CM

## 2025-07-01 DIAGNOSIS — F20.0 PARANOID SCHIZOPHRENIA (HCC): Primary | ICD-10-CM

## 2025-07-01 DIAGNOSIS — F41.1 GAD (GENERALIZED ANXIETY DISORDER): ICD-10-CM

## 2025-07-01 DIAGNOSIS — F33.3 MAJOR DEPRESSIVE DISORDER, RECURRENT, SEVERE WITH PSYCHOTIC FEATURES (HCC): ICD-10-CM

## 2025-07-01 PROCEDURE — 99214 OFFICE O/P EST MOD 30 MIN: CPT

## 2025-07-01 RX ORDER — ALPRAZOLAM 1 MG/1
1 TABLET ORAL 4 TIMES DAILY PRN
Qty: 120 TABLET | Refills: 0 | Status: SHIPPED | OUTPATIENT
Start: 2025-07-03

## 2025-07-01 RX ORDER — HALOPERIDOL 10 MG/1
TABLET ORAL
Qty: 90 TABLET | Refills: 1 | Status: SHIPPED | OUTPATIENT
Start: 2025-07-01

## 2025-07-01 RX ORDER — ZOLPIDEM TARTRATE 10 MG/1
10 TABLET ORAL
Qty: 30 TABLET | Refills: 0 | Status: SHIPPED | OUTPATIENT
Start: 2025-07-03

## 2025-07-01 NOTE — ASSESSMENT & PLAN NOTE
Improving    Continue Trintellix 10 mg daily to help with depression and anxiety   Continue Ambien 10 mg at bedtime as needed for insomnia  Recommended continuation of outpatient therapy at [facility]      Orders:    vortioxetine (TRINTELLIX) 10 MG tablet; Take 1 tablet (10 mg total) by mouth daily

## 2025-07-01 NOTE — ASSESSMENT & PLAN NOTE
Variable   Increase Haldol to 10 mg in the morning and 20 mg at bedtime to help with hallucinations  Increase to Xanax 1 mg QID as needed for times of anxiety  Continue Trintellix 10 mg daily to help with depression and anxiety  Recommended continuation of outpatient therapy at [facility]      Orders:    ALPRAZolam (XANAX) 1 mg tablet; Take 1 tablet (1 mg total) by mouth 4 (four) times a day as needed for anxiety Do not start before July 3, 2025.    vortioxetine (TRINTELLIX) 10 MG tablet; Take 1 tablet (10 mg total) by mouth daily

## 2025-07-01 NOTE — ASSESSMENT & PLAN NOTE
Variable   Increase Haldol to 10 mg in the morning and 20 mg at bedtime to help with hallucinations  Increase to Xanax 1 mg QID as needed for times of anxiety  Continue Trintellix 10 mg daily to help with depression and anxiety   Continue Ambien 10 mg at bedtime as needed for insomnia  Recommended continuation of outpatient therapy at [facility]      Orders:    haloperidol (HALDOL) 10 mg tablet; Take 1 tablet (10 mg) in the morning and 2 tablets (20 mg) at bedtime

## 2025-07-07 ENCOUNTER — TELEPHONE (OUTPATIENT)
Dept: PSYCHIATRY | Facility: CLINIC | Age: 40
End: 2025-07-07

## 2025-07-07 NOTE — TELEPHONE ENCOUNTER
Left voicemail informing patient and/or parent/guardian of the Psych Encounter form needing to be signed as a requirement from the insurance company for billing purposes. Patient can access form via [employer] and sign electronically.     Please make patient aware this form must be signed for each visit as a requirement to continue future visits with provider.

## 2025-07-08 ENCOUNTER — TELEMEDICINE (OUTPATIENT)
Dept: BEHAVIORAL/MENTAL HEALTH CLINIC | Facility: CLINIC | Age: 40
End: 2025-07-08
Payer: COMMERCIAL

## 2025-07-08 DIAGNOSIS — F41.1 GAD (GENERALIZED ANXIETY DISORDER): ICD-10-CM

## 2025-07-08 DIAGNOSIS — F33.3 MAJOR DEPRESSIVE DISORDER, RECURRENT, SEVERE WITH PSYCHOTIC FEATURES (HCC): Primary | ICD-10-CM

## 2025-07-08 DIAGNOSIS — F20.0 PARANOID SCHIZOPHRENIA (HCC): ICD-10-CM

## 2025-07-08 PROCEDURE — 90837 PSYTX W PT 60 MINUTES: CPT | Performed by: COUNSELOR

## 2025-07-08 NOTE — PSYCH
Virtual Regular VisitName: Oksana Yuan      : 1985      MRN: [id number]  Encounter Provider: Emmy Davis  Encounter Date: 2025   Encounter department: [facility] THERAPIST MENTAL HEALTH OUTPATIENT  :  Assessment & Plan  Major depressive disorder, recurrent, severe with psychotic features (HCC)         MARIBEL (generalized anxiety disorder)         Paranoid schizophrenia (HCC)             Goals addressed in session: Goal 2     DATA: Therapist met with Oksana for individual therapy via [employer] platform. Therapist started with check in. Oksana shared she is doing good. Therapist followed up on homework from previous session. Oksana stated that she only bought 4 packs of cigarettes last week, noting it was hard, however she had money left over to do laundry. Oksana stated that she wanted to continue purchasing only 4 packs to see how things go. Also, she shared that she has been practicing the 5-4-3-2-1 grounding technique and finds it helpful. Therapist invited Oksana to practice the technique again and she agreed to do it. Therapist and Oksana took turns naming 5 things could see, 4 things could hear, 3 things could feel, and could not identify anything that smell or taste. Therapist checked in with Oksana around her last medication management appointment. Oksana shared medication was increased (Xanax and Haldol). She shared it has been helpful noting she is sleeping better and not feeling anxiety as bad. Therapist then checked in with Oksana around her self-care. She stated that she took a shower and ate today. Discussed needing to clean dishes. Therapist and Oksana discussed the pros and cons of doing and not doing dishes (I.e. have clean dishes, are not piled up, and if not done can attract bugs). Therapist then talked to Oksana about Peer Support and [employer] as Oksana had mentioned wanting to get out more and connected to the community. Therapist explained both  programs and Oksana shared she was open to learning more about Peer Support. Therapist offered to reach out to  of [facility] and have her call Oksana to discuss the program and see if it would be a good fit. Oksana agreed to have therapist contact Peer . Therapist then invited Oksana to talk about anything else that was on her mind. She shared that her previous partner (broke up last June) has been on her mind. She talked about feeling like a different person, more anxious and less vocal since her partner left. Therapist asked Oksana more questions to learn about the relationship. Oksana shared that she was with her previous partner for 7 years and that the person told her did not want to be with her any longer and left. Therapist validated Oksana's feelings noting 7 years is a long time. Also, Oksana talked about having been dating other people. She shared one person in particular that she has gone on a couple dates with. Therapist talked with Oksana about what she is looking for in a partner and what she likes about this person. She noted the person will treat her nice. Therapist asked Oksana to tell therapist more and she stated they buy her things that she needs. Therapist asked Oksana if the person expects anything in return for buying things and she replied no. She stated that she is looking for someone who is patient and understanding. Therapist highlighted Oksana's insight into knowing what she is looking for in a partner and that she is moving forward even if it is hard right now. Therapist then invited Oksana to participate in a guided meditation called Flashtalking. After completing the guided meditation therapist asked Oksana to share her experience. She stated that she enjoyed the meditation and found it relaxing. Therapist then reviewed day/time of next appointment.   During this session, this clinician used the following therapeutic modalities:  "Client-centered Therapy, Mindfulness-based Strategies, and Supportive Psychotherapy    Substance Abuse was not addressed during this session. If the client is diagnosed with a co-occurring substance use disorder, please indicate any changes in the frequency or amount of use: N/A. Stage of change for addressing substance use diagnoses: No substance use/Not applicable    ASSESSMENT:  Oksana presents with a Euthymic/ normal mood. Sharans affect is Normal range and intensity, which is congruent, with their mood and the content of the session. The client has made progress on their goals as evidenced by practicing grounding and breathing techniques.    Oksana presents with a none risk of suicide, none risk of self-harm, and none risk of harm to others.    For any risk assessment that surpasses a \"low\" rating, a safety plan must be developed.    A safety plan was indicated: no  If yes, describe in detail N/A    PLAN: Between sessions, Oksana will continue to practice coping skills and explore Peer Support services. At the next session, the therapist will use Client-centered Therapy, Mindfulness-based Strategies, and Supportive Psychotherapy to address coping skills for anxiety and self-care.    Behavioral Health Treatment Plan [facility]: Diagnosis and Treatment Plan explained to Oksana, Oksana relates understanding diagnosis and is agreeable to Treatment Plan. Yes     Depression Follow-up Plan Completed: Not applicable     Reason for visit is No chief complaint on file.     Recent Visits  No visits were found meeting these conditions.  Showing recent visits within past 7 days and meeting all other requirements  Today's Visits  Date Type Provider Dept   07/08/25 Telemedicine mEmy Davis [facility] Therapist Samy   Showing today's visits and meeting all other requirements  Future Appointments  No visits were found meeting these conditions.  Showing future appointments within next 150 days and meeting all " other requirements     History of Present Illness     HPI    Past Medical History   Past Medical History[1]  Past Surgical History[2]  Current Outpatient Medications   Medication Instructions    ALPRAZolam (XANAX) 1 mg, Oral, 4 times daily PRN    estradiol valerate (DELESTROGEN) 40 MG/ML injection     haloperidol (HALDOL) 10 mg tablet Take 1 tablet (10 mg) in the morning and 2 tablets (20 mg) at bedtime    Progesterone 200 MG CAPS     vortioxetine (TRINTELLIX) 10 mg, Oral, Daily    zolpidem (AMBIEN) 10 mg, Oral, Daily at bedtime PRN     Allergies[3]    Objective   There were no vitals taken for this visit.    Video Exam  Physical Exam     Administrative Statements   Encounter provider Emmy Davis    The Patient is located at Home and in the following state in which I hold an active license [place].    The patient was identified by name and date of birth. Oksana Yuan was informed that this is a telemedicine visit and that the visit is being conducted through the Epic Embedded platform. She agrees to proceed..  My office door was closed. No one else was in the room.  She acknowledged consent and understanding of privacy and security of the video platform. The patient has agreed to participate and understands they can discontinue the visit at any time.      Visit Time  Start Time: 1500  Stop Time: 1555  Total Visit Time: 55 minutes         [1] No past medical history on file.  [2] No past surgical history on file.  [3]   Allergies  Allergen Reactions    Penicillins Anaphylaxis

## 2025-07-21 ENCOUNTER — TELEMEDICINE (OUTPATIENT)
Dept: BEHAVIORAL/MENTAL HEALTH CLINIC | Facility: CLINIC | Age: 40
End: 2025-07-21
Payer: COMMERCIAL

## 2025-07-21 DIAGNOSIS — F41.1 GAD (GENERALIZED ANXIETY DISORDER): ICD-10-CM

## 2025-07-21 DIAGNOSIS — F33.3 MAJOR DEPRESSIVE DISORDER, RECURRENT, SEVERE WITH PSYCHOTIC FEATURES (HCC): ICD-10-CM

## 2025-07-21 DIAGNOSIS — F20.0 PARANOID SCHIZOPHRENIA (HCC): Primary | ICD-10-CM

## 2025-07-21 PROCEDURE — 90834 PSYTX W PT 45 MINUTES: CPT | Performed by: COUNSELOR

## 2025-07-21 NOTE — PSYCH
Virtual Regular VisitName: Oksana Yuan      : 1985      MRN: [id number]  Encounter Provider: Emmy Davis  Encounter Date: 2025   Encounter department: [facility] THERAPIST MENTAL HEALTH OUTPATIENT  :  Assessment & Plan  Paranoid schizophrenia (HCC)         Major depressive disorder, recurrent, severe with psychotic features (HCC)         MARIBEL (generalized anxiety disorder)             Goals addressed in session: Goal 1     DATA: Therapist met with Oksana for virtual individual therapy appointment via [employer] platform. Therapist started with check in. Oksana shared that she was feeling anxious. Therapist invited her to tell therapist more about it. She shared that there is household stress as her roommate got pulled over due to her car not being inspected and now the roommates do not have a car. The roommate may not be able to pay for the inspection so it is not clear when or if the roommate will get the car back. She shared worries about getting groceries and her prescriptions due to be picked up on the  of this month. Therapist used reflective listening and validated her feelings. Therapist invited Oksana to problem solve and identified alternatives such as walking to [employer] or using [employer] for grocery delivery. Therapist offered to contact prescriber at [facility] and observed Yani was off today. Therapist then offered to email  staff to see who else could contact. Oksana provided verbal permission for therapist to send a message. Therapist talked with her about option to check if pharmacy would deliver medication. Therapist explored online with Oksana if her pharmacy could deliver her medication. Due to some of the medication being a controlled service therapist explained they likely will not deliver those and encouraged her to call [facility] to confirm. Therapist talked with Oksana about option to use [employer] to  her medication. Oksana  shared that she has used [employer] before and stated she could also walk to get them as it is about the same distance away as [employer]. During appointment therapist followed up on [facility] referral. Oksana shared that she spoke to Adonis from [facility] [employer] and is interested in the program. Therapist invited Oksana to complete the referral. Therapist explained referral and added her responses to questions as needed. Due to meeting virtually therapist asked Oksana if she would be okay with therapist noting verbal consent and she agreed. Therapist explained that [facility] services may have her sign the referral when they connect with her. Therapist then submitted the referral. Therapist explained that there is a small wait list and shared hope that she will be able to get connected within the next month. Therapist asked Oksana to talk about anything else that was on her mind today. She shared that she was having reoccurring bad dreams. Therapist invited her to tell therapist more. She shared two dreams she had, one where a friend  and the other where she received a Tanya gift from her ex-partner. Therapist wondered if the dreams were connected to the recent stress/anxiety in the home and she shared she did not think so. Therapist shared observation of a possible theme of sadness and loss. Therapist encouraged Oksana to keep track of dreams and she shared that she does not like to journal. Therapist asked her about her nighttime routine and she shared that she takes her medication and goes to bed. Therapist suggested before going to sleep to think about/identify things she is thankful for or was good about her day. Oksana shared she was open to trying that. Oksana shared she did not have anything else on her mind. Therapist invited Oksana to participate in a mindful breathing exercise. Therapist read and followed a guided script for a mindful breathing exercise. Afterwards therapist asked Oksana about her  "experience. She shared that it was calming. Therapist asked her if she is practicing breathing exercises outside of appointments and she stated that she is and find them helpful. Therapist reviewed homework until next appointment to practice thinking positive thoughts and things are thankful for before go to bed and utilize resources to obtain groceries and prescriptions. Therapist confirmed with Oksana that she has therapist email address to contact if needed to reach out before next appointment. Also therapist offered to schedule out appointments through October. Therapist scheduled the next two with Oksana then informed her therapist would finish scheduling them after the appointment. Therapist then ended the appointment.   During this session, this clinician used the following therapeutic modalities: Client-centered Therapy, Mindfulness-based Strategies, and Supportive Psychotherapy    Substance Abuse was not addressed during this session. If the client is diagnosed with a co-occurring substance use disorder, please indicate any changes in the frequency or amount of use: N/A. Stage of change for addressing substance use diagnoses: No substance use/Not applicable    ASSESSMENT:  Oksana presents with a Anxious mood. Sharans affect is Normal range and intensity, which is congruent, with their mood and the content of the session. The client has made progress on their goals as evidenced by continuing to practice mindful breathing.    Oksana presents with a none risk of suicide, none risk of self-harm, and none risk of harm to others.    For any risk assessment that surpasses a \"low\" rating, a safety plan must be developed.    A safety plan was indicated: no  If yes, describe in detail N/A    PLAN: Between sessions, Oksana will utilize other options to get her groceries and prescriptions. She will also continue to use coping strategies for anxiety/stress. At the next session, the therapist will use " Client-centered Therapy, Cognitive Behavioral Therapy, Mindfulness-based Strategies, and Supportive Psychotherapy to address identifying coping strategies for anxiety and creating a plan for self care.    Behavioral Health Treatment Plan [facility]: Diagnosis and Treatment Plan explained to John Gandhiilla relates understanding diagnosis and is agreeable to Treatment Plan. Yes     Depression Follow-up Plan Completed: Not applicable     Reason for visit is No chief complaint on file.     Recent Visits  No visits were found meeting these conditions.  Showing recent visits within past 7 days and meeting all other requirements  Today's Visits  Date Type Provider Dept   07/21/25 Telemedicine Emmy Davis [facility] Therapist op   Showing today's visits and meeting all other requirements  Future Appointments  No visits were found meeting these conditions.  Showing future appointments within next 150 days and meeting all other requirements     History of Present Illness     HPI    Past Medical History   Past Medical History[1]  Past Surgical History[2]  Current Outpatient Medications   Medication Instructions    ALPRAZolam (XANAX) 1 mg, Oral, 4 times daily PRN    estradiol valerate (DELESTROGEN) 40 MG/ML injection     haloperidol (HALDOL) 10 mg tablet Take 1 tablet (10 mg) in the morning and 2 tablets (20 mg) at bedtime    Progesterone 200 MG CAPS     vortioxetine (TRINTELLIX) 10 mg, Oral, Daily    zolpidem (AMBIEN) 10 mg, Oral, Daily at bedtime PRN     Allergies[3]    Objective   There were no vitals taken for this visit.    Video Exam  Physical Exam     Administrative Statements   Encounter provider Emmy Davis    The Patient is located at Home and in the following state in which I hold an active license [place].    The patient was identified by name and date of birth. Oksana Yuan was informed that this is a telemedicine visit and that the visit is being conducted through the Epic Embedded platform. She  agrees to proceed..  My office door was closed. No one else was in the room.  She acknowledged consent and understanding of privacy and security of the video platform. The patient has agreed to participate and understands they can discontinue the visit at any time.        Visit Time  Start Time: 1103  Stop Time: 1152  Total Visit Time: 49 minutes       [1] No past medical history on file.  [2] No past surgical history on file.  [3]   Allergies  Allergen Reactions    Penicillins Anaphylaxis

## 2025-07-29 ENCOUNTER — TELEMEDICINE (OUTPATIENT)
Dept: PSYCHIATRY | Facility: CLINIC | Age: 40
End: 2025-07-29
Payer: COMMERCIAL

## 2025-07-29 DIAGNOSIS — F41.1 GAD (GENERALIZED ANXIETY DISORDER): ICD-10-CM

## 2025-07-29 DIAGNOSIS — F20.0 PARANOID SCHIZOPHRENIA (HCC): Primary | ICD-10-CM

## 2025-07-29 DIAGNOSIS — G47.00 INSOMNIA, UNSPECIFIED TYPE: ICD-10-CM

## 2025-07-29 DIAGNOSIS — F33.3 MAJOR DEPRESSIVE DISORDER, RECURRENT, SEVERE WITH PSYCHOTIC FEATURES (HCC): ICD-10-CM

## 2025-07-29 PROCEDURE — 99214 OFFICE O/P EST MOD 30 MIN: CPT

## 2025-07-29 RX ORDER — QUETIAPINE FUMARATE 50 MG/1
50 TABLET, FILM COATED ORAL
Qty: 30 TABLET | Refills: 1 | Status: SHIPPED | OUTPATIENT
Start: 2025-07-29

## 2025-07-29 RX ORDER — ZOLPIDEM TARTRATE 10 MG/1
10 TABLET ORAL
Qty: 30 TABLET | Refills: 0 | Status: SHIPPED | OUTPATIENT
Start: 2025-07-31

## 2025-07-29 RX ORDER — ALPRAZOLAM 1 MG/1
1 TABLET ORAL 4 TIMES DAILY PRN
Qty: 120 TABLET | Refills: 0 | Status: SHIPPED | OUTPATIENT
Start: 2025-07-31

## 2025-07-30 ENCOUNTER — APPOINTMENT (EMERGENCY)
Age: 40
End: 2025-07-30
Payer: COMMERCIAL

## 2025-07-30 ENCOUNTER — HOSPITAL ENCOUNTER (EMERGENCY)
Age: 40
Discharge: HOME | End: 2025-07-30
Attending: EMERGENCY MEDICINE | Admitting: EMERGENCY MEDICINE
Payer: COMMERCIAL

## 2025-07-30 VITALS
RESPIRATION RATE: 9 BRPM | OXYGEN SATURATION: 98 % | DIASTOLIC BLOOD PRESSURE: 85 MMHG | SYSTOLIC BLOOD PRESSURE: 123 MMHG | WEIGHT: 175.71 LBS | HEART RATE: 47 BPM | BODY MASS INDEX: 25.15 KG/M2 | TEMPERATURE: 97 F | HEIGHT: 70 IN

## 2025-07-30 DIAGNOSIS — R56.9 SEIZURE (HCC): Primary | ICD-10-CM

## 2025-07-30 DIAGNOSIS — Z87.898 HISTORY OF BENZODIAZEPINE USE: ICD-10-CM

## 2025-07-30 LAB
ALBUMIN SERPL BCG-MCNC: 4.5 G/DL (ref 3.5–5.2)
ALP SERPL-CCNC: 77 U/L (ref 40–105)
ALT SERPL W P-5'-P-CCNC: 9 U/L (ref 5–33)
AMPHETAMINES SERPL QL SCN: NEGATIVE
ANION GAP SERPL CALCULATED.3IONS-SCNC: 23 MMOL/L (ref 7–16)
AST SERPL W P-5'-P-CCNC: 16 U/L (ref 5–32)
ATRIAL RATE: 49 BPM
BARBITURATES UR QL: NEGATIVE
BASOPHILS # BLD AUTO: 0.06 THOUSANDS/ÂΜL (ref 0–0.1)
BASOPHILS NFR BLD AUTO: 1 % (ref 0–1)
BENZODIAZ UR QL: NEGATIVE
BILIRUB SERPL-MCNC: 0.34 MG/DL (ref 0.2–1.2)
BILIRUB UR QL STRIP: NEGATIVE
BUN SERPL-MCNC: 13 MG/DL (ref 8–23)
CALCIUM SERPL-MCNC: 9.5 MG/DL (ref 8.6–10.2)
CHLORIDE SERPL-SCNC: 105 MMOL/L (ref 98–107)
CLARITY UR: CLEAR
CO2 SERPL-SCNC: 15 MMOL/L (ref 22–29)
COCAINE UR QL: NEGATIVE
COLOR UR: YELLOW
CREAT SERPL-MCNC: 0.91 MG/DL (ref 0.7–0.9)
EOSINOPHIL # BLD AUTO: 0.11 THOUSAND/ÂΜL (ref 0–0.61)
EOSINOPHIL NFR BLD AUTO: 1 % (ref 0–6)
ERYTHROCYTE [DISTWIDTH] IN BLOOD BY AUTOMATED COUNT: 12.3 % (ref 11.6–15.1)
FENTANYL UR QL SCN: NEGATIVE
GLUCOSE SERPL-MCNC: 111 MG/DL (ref 65–140)
GLUCOSE SERPL-MCNC: 145 MG/DL (ref 65–140)
GLUCOSE UR STRIP-MCNC: NEGATIVE MG/DL
HCT VFR BLD AUTO: 41.9 % (ref 36.5–46.1)
HGB BLD-MCNC: 14.1 G/DL (ref 12–15.4)
HGB UR QL STRIP.AUTO: NEGATIVE
HYDROCODONE UR QL SCN: NEGATIVE
IMM GRANULOCYTES # BLD AUTO: 0.03 THOUSAND/UL (ref 0–0.2)
IMM GRANULOCYTES NFR BLD AUTO: 0 % (ref 0–2)
KETONES UR STRIP-MCNC: ABNORMAL MG/DL
LEUKOCYTE ESTERASE UR QL STRIP: NEGATIVE
LYMPHOCYTES # BLD AUTO: 2.12 THOUSANDS/ÂΜL (ref 0.6–4.47)
LYMPHOCYTES NFR BLD AUTO: 21 % (ref 14–44)
MCH RBC QN AUTO: 31.5 PG (ref 26.8–34.3)
MCHC RBC AUTO-ENTMCNC: 33.7 G/DL (ref 31.4–37.4)
MCV RBC AUTO: 94 FL (ref 82–98)
METHADONE UR QL: NEGATIVE
MONOCYTES # BLD AUTO: 0.64 THOUSAND/ÂΜL (ref 0.17–1.22)
MONOCYTES NFR BLD AUTO: 6 % (ref 4–12)
NEUTROPHILS # BLD AUTO: 7.23 THOUSANDS/ÂΜL (ref 1.85–7.62)
NEUTS SEG NFR BLD AUTO: 71 % (ref 43–75)
NITRITE UR QL STRIP: NEGATIVE
NRBC BLD AUTO-RTO: 0 /100 WBCS
OPIATES UR QL SCN: NEGATIVE
OXYCODONE+OXYMORPHONE UR QL SCN: NEGATIVE
P AXIS: 50 DEGREES
PCP UR QL: NEGATIVE
PH UR STRIP.AUTO: 6 [PH]
PLATELET # BLD AUTO: 440 THOUSANDS/UL (ref 149–390)
PMV BLD AUTO: 8.9 FL (ref 8.9–12.7)
POTASSIUM SERPL-SCNC: 3.9 MMOL/L (ref 3.5–5.1)
PR INTERVAL: 138 MS
PROT SERPL-MCNC: 7.1 G/DL (ref 6.4–8.3)
PROT UR STRIP-MCNC: NEGATIVE MG/DL
QRS AXIS: -2 DEGREES
QRSD INTERVAL: 82 MS
QT INTERVAL: 462 MS
QTC INTERVAL: 417 MS
RBC # BLD AUTO: 4.48 MILLION/UL (ref 3.88–5.12)
SODIUM SERPL-SCNC: 143 MMOL/L (ref 136–145)
SP GR UR STRIP.AUTO: 1.01 (ref 1–1.03)
T WAVE AXIS: 30 DEGREES
THC UR QL: POSITIVE
UROBILINOGEN UR QL STRIP.AUTO: 0.2 E.U./DL
VENTRICULAR RATE: 49 BPM
WBC # BLD AUTO: 10.19 THOUSAND/UL (ref 4.31–10.16)

## 2025-07-30 PROCEDURE — 96361 HYDRATE IV INFUSION ADD-ON: CPT

## 2025-07-30 PROCEDURE — 81003 URINALYSIS AUTO W/O SCOPE: CPT | Performed by: PHYSICIAN ASSISTANT

## 2025-07-30 PROCEDURE — 85025 COMPLETE CBC W/AUTO DIFF WBC: CPT | Performed by: PHYSICIAN ASSISTANT

## 2025-07-30 PROCEDURE — 99285 EMERGENCY DEPT VISIT HI MDM: CPT

## 2025-07-30 PROCEDURE — 96360 HYDRATION IV INFUSION INIT: CPT

## 2025-07-30 PROCEDURE — 93010 ELECTROCARDIOGRAM REPORT: CPT | Performed by: INTERNAL MEDICINE

## 2025-07-30 PROCEDURE — 70450 CT HEAD/BRAIN W/O DYE: CPT

## 2025-07-30 PROCEDURE — 80307 DRUG TEST PRSMV CHEM ANLYZR: CPT | Performed by: PHYSICIAN ASSISTANT

## 2025-07-30 PROCEDURE — 80053 COMPREHEN METABOLIC PANEL: CPT | Performed by: PHYSICIAN ASSISTANT

## 2025-07-30 PROCEDURE — 82948 REAGENT STRIP/BLOOD GLUCOSE: CPT

## 2025-07-30 PROCEDURE — 93005 ELECTROCARDIOGRAM TRACING: CPT

## 2025-07-30 RX ADMIN — SODIUM CHLORIDE 1000 ML: 0.9 INJECTION, SOLUTION INTRAVENOUS at 09:56

## 2025-08-01 ENCOUNTER — TELEPHONE (OUTPATIENT)
Dept: PSYCHIATRY | Facility: CLINIC | Age: 40
End: 2025-08-01

## 2025-08-05 ENCOUNTER — TELEPHONE (OUTPATIENT)
Age: 40
End: 2025-08-05

## 2025-08-05 ENCOUNTER — TELEMEDICINE (OUTPATIENT)
Dept: BEHAVIORAL/MENTAL HEALTH CLINIC | Facility: CLINIC | Age: 40
End: 2025-08-05
Payer: COMMERCIAL

## 2025-08-05 DIAGNOSIS — F41.1 GAD (GENERALIZED ANXIETY DISORDER): ICD-10-CM

## 2025-08-05 DIAGNOSIS — F20.0 PARANOID SCHIZOPHRENIA (HCC): ICD-10-CM

## 2025-08-05 DIAGNOSIS — F33.3 MAJOR DEPRESSIVE DISORDER, RECURRENT, SEVERE WITH PSYCHOTIC FEATURES (HCC): Primary | ICD-10-CM

## 2025-08-05 PROCEDURE — 90832 PSYTX W PT 30 MINUTES: CPT | Performed by: COUNSELOR

## 2025-08-11 ENCOUNTER — OFFICE VISIT (OUTPATIENT)
Age: 40
End: 2025-08-11
Payer: COMMERCIAL

## 2025-08-18 ENCOUNTER — TELEPHONE (OUTPATIENT)
Dept: PSYCHIATRY | Facility: CLINIC | Age: 40
End: 2025-08-18

## 2025-08-18 ENCOUNTER — TELEMEDICINE (OUTPATIENT)
Dept: BEHAVIORAL/MENTAL HEALTH CLINIC | Facility: CLINIC | Age: 40
End: 2025-08-18
Payer: COMMERCIAL

## 2025-08-18 DIAGNOSIS — F41.1 GAD (GENERALIZED ANXIETY DISORDER): ICD-10-CM

## 2025-08-18 DIAGNOSIS — F33.3 MAJOR DEPRESSIVE DISORDER, RECURRENT, SEVERE WITH PSYCHOTIC FEATURES (HCC): ICD-10-CM

## 2025-08-18 DIAGNOSIS — F20.0 PARANOID SCHIZOPHRENIA (HCC): Primary | ICD-10-CM

## 2025-08-18 PROCEDURE — 90834 PSYTX W PT 45 MINUTES: CPT | Performed by: COUNSELOR
